# Patient Record
Sex: MALE | Race: WHITE | NOT HISPANIC OR LATINO
[De-identification: names, ages, dates, MRNs, and addresses within clinical notes are randomized per-mention and may not be internally consistent; named-entity substitution may affect disease eponyms.]

---

## 2017-02-11 ENCOUNTER — TRANSCRIPTION ENCOUNTER (OUTPATIENT)
Age: 57
End: 2017-02-11

## 2017-08-04 ENCOUNTER — TRANSCRIPTION ENCOUNTER (OUTPATIENT)
Age: 57
End: 2017-08-04

## 2017-08-20 ENCOUNTER — TRANSCRIPTION ENCOUNTER (OUTPATIENT)
Age: 57
End: 2017-08-20

## 2021-01-02 ENCOUNTER — EMERGENCY (EMERGENCY)
Facility: HOSPITAL | Age: 61
LOS: 0 days | Discharge: HOME | End: 2021-01-02
Attending: EMERGENCY MEDICINE | Admitting: EMERGENCY MEDICINE
Payer: COMMERCIAL

## 2021-01-02 VITALS
RESPIRATION RATE: 19 BRPM | TEMPERATURE: 98 F | SYSTOLIC BLOOD PRESSURE: 129 MMHG | OXYGEN SATURATION: 96 % | HEART RATE: 100 BPM | DIASTOLIC BLOOD PRESSURE: 76 MMHG

## 2021-01-02 VITALS
SYSTOLIC BLOOD PRESSURE: 129 MMHG | OXYGEN SATURATION: 96 % | TEMPERATURE: 98 F | DIASTOLIC BLOOD PRESSURE: 76 MMHG | HEART RATE: 86 BPM | RESPIRATION RATE: 19 BRPM | WEIGHT: 195.11 LBS

## 2021-01-02 DIAGNOSIS — U07.1 COVID-19: ICD-10-CM

## 2021-01-02 DIAGNOSIS — F32.9 MAJOR DEPRESSIVE DISORDER, SINGLE EPISODE, UNSPECIFIED: ICD-10-CM

## 2021-01-02 DIAGNOSIS — Z87.891 PERSONAL HISTORY OF NICOTINE DEPENDENCE: ICD-10-CM

## 2021-01-02 DIAGNOSIS — E78.5 HYPERLIPIDEMIA, UNSPECIFIED: ICD-10-CM

## 2021-01-02 DIAGNOSIS — E86.0 DEHYDRATION: ICD-10-CM

## 2021-01-02 LAB
ALBUMIN SERPL ELPH-MCNC: 4.1 G/DL — SIGNIFICANT CHANGE UP (ref 3.5–5.2)
ALP SERPL-CCNC: 102 U/L — SIGNIFICANT CHANGE UP (ref 30–115)
ALT FLD-CCNC: 145 U/L — HIGH (ref 0–41)
ANION GAP SERPL CALC-SCNC: 8 MMOL/L — SIGNIFICANT CHANGE UP (ref 7–14)
AST SERPL-CCNC: 104 U/L — HIGH (ref 0–41)
BASOPHILS # BLD AUTO: 0.01 K/UL — SIGNIFICANT CHANGE UP (ref 0–0.2)
BASOPHILS NFR BLD AUTO: 0.2 % — SIGNIFICANT CHANGE UP (ref 0–1)
BILIRUB SERPL-MCNC: 0.4 MG/DL — SIGNIFICANT CHANGE UP (ref 0.2–1.2)
BUN SERPL-MCNC: 15 MG/DL — SIGNIFICANT CHANGE UP (ref 10–20)
CALCIUM SERPL-MCNC: 8.6 MG/DL — SIGNIFICANT CHANGE UP (ref 8.5–10.1)
CHLORIDE SERPL-SCNC: 101 MMOL/L — SIGNIFICANT CHANGE UP (ref 98–110)
CO2 SERPL-SCNC: 29 MMOL/L — SIGNIFICANT CHANGE UP (ref 17–32)
CREAT SERPL-MCNC: 1.1 MG/DL — SIGNIFICANT CHANGE UP (ref 0.7–1.5)
EOSINOPHIL # BLD AUTO: 0.02 K/UL — SIGNIFICANT CHANGE UP (ref 0–0.7)
EOSINOPHIL NFR BLD AUTO: 0.4 % — SIGNIFICANT CHANGE UP (ref 0–8)
GLUCOSE SERPL-MCNC: 142 MG/DL — HIGH (ref 70–99)
HCT VFR BLD CALC: 45.7 % — SIGNIFICANT CHANGE UP (ref 42–52)
HGB BLD-MCNC: 14.9 G/DL — SIGNIFICANT CHANGE UP (ref 14–18)
IMM GRANULOCYTES NFR BLD AUTO: 1.3 % — HIGH (ref 0.1–0.3)
LYMPHOCYTES # BLD AUTO: 1.14 K/UL — LOW (ref 1.2–3.4)
LYMPHOCYTES # BLD AUTO: 23.8 % — SIGNIFICANT CHANGE UP (ref 20.5–51.1)
MCHC RBC-ENTMCNC: 30.9 PG — SIGNIFICANT CHANGE UP (ref 27–31)
MCHC RBC-ENTMCNC: 32.6 G/DL — SIGNIFICANT CHANGE UP (ref 32–37)
MCV RBC AUTO: 94.8 FL — HIGH (ref 80–94)
MONOCYTES # BLD AUTO: 0.4 K/UL — SIGNIFICANT CHANGE UP (ref 0.1–0.6)
MONOCYTES NFR BLD AUTO: 8.4 % — SIGNIFICANT CHANGE UP (ref 1.7–9.3)
NEUTROPHILS # BLD AUTO: 3.16 K/UL — SIGNIFICANT CHANGE UP (ref 1.4–6.5)
NEUTROPHILS NFR BLD AUTO: 65.9 % — SIGNIFICANT CHANGE UP (ref 42.2–75.2)
NRBC # BLD: 0 /100 WBCS — SIGNIFICANT CHANGE UP (ref 0–0)
PLATELET # BLD AUTO: 145 K/UL — SIGNIFICANT CHANGE UP (ref 130–400)
POTASSIUM SERPL-MCNC: 4.6 MMOL/L — SIGNIFICANT CHANGE UP (ref 3.5–5)
POTASSIUM SERPL-SCNC: 4.6 MMOL/L — SIGNIFICANT CHANGE UP (ref 3.5–5)
PROT SERPL-MCNC: 6.6 G/DL — SIGNIFICANT CHANGE UP (ref 6–8)
RBC # BLD: 4.82 M/UL — SIGNIFICANT CHANGE UP (ref 4.7–6.1)
RBC # FLD: 12.2 % — SIGNIFICANT CHANGE UP (ref 11.5–14.5)
SARS-COV-2 RNA SPEC QL NAA+PROBE: DETECTED
SODIUM SERPL-SCNC: 138 MMOL/L — SIGNIFICANT CHANGE UP (ref 135–146)
WBC # BLD: 4.79 K/UL — LOW (ref 4.8–10.8)
WBC # FLD AUTO: 4.79 K/UL — LOW (ref 4.8–10.8)

## 2021-01-02 PROCEDURE — 99284 EMERGENCY DEPT VISIT MOD MDM: CPT

## 2021-01-02 PROCEDURE — 93010 ELECTROCARDIOGRAM REPORT: CPT

## 2021-01-02 PROCEDURE — 71045 X-RAY EXAM CHEST 1 VIEW: CPT | Mod: 26

## 2021-01-02 RX ORDER — ACETAMINOPHEN 500 MG
650 TABLET ORAL ONCE
Refills: 0 | Status: COMPLETED | OUTPATIENT
Start: 2021-01-02 | End: 2021-01-02

## 2021-01-02 RX ORDER — SODIUM CHLORIDE 9 MG/ML
1000 INJECTION, SOLUTION INTRAVENOUS ONCE
Refills: 0 | Status: COMPLETED | OUTPATIENT
Start: 2021-01-02 | End: 2021-01-02

## 2021-01-02 RX ORDER — ONDANSETRON 8 MG/1
4 TABLET, FILM COATED ORAL ONCE
Refills: 0 | Status: COMPLETED | OUTPATIENT
Start: 2021-01-02 | End: 2021-01-02

## 2021-01-02 RX ADMIN — ONDANSETRON 4 MILLIGRAM(S): 8 TABLET, FILM COATED ORAL at 08:01

## 2021-01-02 RX ADMIN — Medication 650 MILLIGRAM(S): at 08:01

## 2021-01-02 RX ADMIN — SODIUM CHLORIDE 1000 MILLILITER(S): 9 INJECTION, SOLUTION INTRAVENOUS at 08:01

## 2021-01-02 NOTE — ED PROVIDER NOTE - CARE PROVIDER_API CALL
[facility]  [address] [place] [address]  Phone: [phone]  Fax: [phone]  Follow Up Time: Routine

## 2021-01-02 NOTE — ED PROVIDER NOTE - ATTENDING CONTRIBUTION TO CARE
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 no cp, sob. no abd pain. pt tested neg for covid on 12/28 with rapid and pcr [facility]. states pulse ox at home have been above 95%. no recent travel, sick contacts. spoke to pmd today who urged pt to come to ED for possible dehydration.     on exam, AFVSS, well bucky nad, ncat, eomi, perrla, DRY mm, lctab, rrr nl s1s2 no mrg, abd soft ntnd, aaox3, no focal deficits, no le edema or calf ttp,     a/p; covid like/viral symptoms, pt swabbed again for covid, dehydration, will do basic labs, ivf, ekg, cxr, re-eval.

## 2021-01-02 NOTE — ED PROVIDER NOTE - PHYSICAL EXAMINATION
CONSTITUTIONAL: Well-developed; well-nourished; in no acute distress.   SKIN: warm, dry  HEAD: Normocephalic; atraumatic.  EYES: no conjunctival injection. EOMI.   ENT: No nasal discharge; airway clear.    NECK: Supple; non tender.  CARD: S1, S2 normal; Regular rate and rhythm.   RESP: No wheezes, rales or rhonchi.  ABD: soft ntnd.    EXT: Normal ROM.  No LE edema.   LYMPH: No acute cervical adenopathy.  NEURO: Alert, oriented, grossly unremarkable. No FND.   PSYCH: Cooperative, appropriate.

## 2021-01-02 NOTE — ED ADULT TRIAGE NOTE - CHIEF COMPLAINT QUOTE
Pt states he was tested for covid positive on Thursday after having low grade fever for weeks, but has started getting dry mouth, dry cough. Pt said his PMD told him to come becouse he may be dehydrated. Pt has no fever now. He says he take tylenols every time the fever comes.

## 2021-01-02 NOTE — ED PROVIDER NOTE - NS ED ROS FT
Review of Systems:  CONSTITUTIONAL: No fever, No diaphoresis   SKIN: No rash  HEMATOLOGIC: No abnormal bleeding or bruising  EYES: No eye pain, No blurred vision  ENT: No sore throat, No neck pain, No rhinorrhea   RESPIRATORY: No shortness of breath, No cough  CARDIAC: No chest pain, No palpitations  GI: No abdominal pain, +nausea, No vomiting, +diarrhea, No constipation, No bright red blood per rectum or melena. No flank pain  : No dysuria, frequency, hematuria.   MUSCULOSKELETAL: No joint paint, No swelling, No back pain, +myalgias   NEUROLOGIC: No numbness, No focal weakness, No headache, No dizziness  All other systems negative, unless specified in HPI

## 2021-01-02 NOTE — ED ADULT NURSE NOTE - OBJECTIVE STATEMENT
Pt said he has being getting low grade fever at home for 1weeks, tested for covid + on Thursday. Bu t has noticed an increase dryness in his mouth. Dry cough from yesterday. Pt takes Tylenol for hid low grade fever. He said his PMD said to come  because he be dehydrated. He is no Eliquis because of Pleural effusion he had in the past. No fever in ER

## 2021-01-02 NOTE — ED PROVIDER NOTE - NSFOLLOWUPINSTRUCTIONS_ED_ALL_ED_FT
Novel Coronavirus (COVID-19)  The Facts  What is a coronavirus?  Coronaviruses are a large family of viruses that cause illnesses ranging from the common cold  to more severe diseases such as Middle East Respiratory Syndrome (MERS) and Severe Acute  Respiratory Syndrome (SARS).  What is Novel Coronavirus (COVID-19)?  COVID-19 is a new strain of Coronavirus that has not been previously identified in humans. COVID-19  was identified in [place], [place], [place] in December 2019 (COVID-19). COVID-19 has  since been identified outside of [place], in a growing number of countries internationally, including  the [place].  Where can I find the most recent information about COVID-19?  The Centers for Disease Control and Prevention (CDC) is closely monitoring the outbreak caused by the  COVID-19. For the latest information about COVID- 19, visit the CDC website at  https://www.cdc.gov/coronavirus/index.html  How are coronaviruses spread?  Coronaviruses can be transmitted from person-to- person, usually after close contact with an infected person,  for example, in a household, workplace, or healthcare setting via droplets that become airborne after a cough  or sneeze by an affected person. These droplets can then infect a nearby person. It is likely transmission also  occurs by touching recently contaminated surfaces.  What are the symptoms of coronavirus infection?  It depends on the virus, but common signs include fever and/or respiratory symptoms such as  cough and shortness of breath. In more severe cases, infection can cause pneumonia, severe acute  respiratory syndrome, kidney failure and even death. Fortunately, most cases of COVID-19 have an  illness no different than the influenza “flu”. With a majority of these patients having mild symptoms  and overall mortality which appears to be not much different than the flu.  Is there a treatment for a COVID-19?  There is no specific treatment for disease caused by COVID-19. However, many of the symptoms can  be treated based on the patient’s clinical condition. Supportive care for infected persons can be highly  effective.  What can I do to protect myself?  Washing your hands, covering your cough, and disinfecting surfaces are the best precautionary  measures. It is also advisable to avoid close contact with anyone showing symptoms of respiratory  illness such as coughing and sneezing. Those with symptoms should wear a surgical mask when  around others.  What can I do to protect those around me?  If you have been identified as someone who may be infected with COVID-19, we recommend you  follow the self-isolation procedures outlined below to protect those around you and limit the spread  of this virus.   March 3, 2020  Recommendations for Patients Advised to Self-Isolate  for Possible COVID-19 Exposure  We recommend the below precautionary steps from now until 14 days from when you  returned from your travel or date of your last known possible contact:  - Do not go to work, school, or public areas. Avoid using public transportation, ride-sharing, or  taxis.  - As much as possible, separate yourself from other people in your home. If you can, you should  stay in a room and away from other people in your home. Also, you should use a separate  bathroom, if available.  - Wear the supplied mask whenever you are around other people.  - If you have a non-urgent medical appointment, please reschedule for a later date. If the  appointment is urgent, please call the healthcare provider and tell them that you are on selfisolation for possible COVID-19. This will help the healthcare provider’s office take steps to keep  other people from getting infected or exposed. If you can reschedule routine appointments, do  so.  - Wash your hands often with soap and water for at least 15 to 20 seconds or clean your hands  with an alcohol-based hand  that contains 60 to 95% alcohol, covering all surfaces of  your hands and rubbing them together until they feel dry. Soap and water should be used  preferentially if hands are visibly dirty.  - Cover your mouth and nose with a tissue when you cough or sneeze. Throw used tissues in a  lined trash can; immediately wash your hands.  - Avoid touching your eyes, nose, and mouth with your hands.  - Avoid sharing personal household items. You should not share dishes, drinking glasses, cups,  eating utensils, towels, or bedding with other people or pets in your home. After using these  items, they should be washed thoroughly with soap and water.  - Clean and disinfect all “high-touch” surfaces every day. High touch surfaces include counters,  tabletops, doorknobs, light switches, remote controls, bathroom fixtures, toilets, phones,  keyboards, tablets, and bedside tables. Also, clean any surfaces that may have blood, stool, or  body fluids on them.       If you develop worsening symptoms:  - If you develop worsening symptoms, such as severe shortness of breath, please call [phone] option #9. They will assist you in determining your next steps.  During your time on self-isolation do the following:  - Work from home if you are able to so.  - Limit social isolation by talking with friends and family on the phone or with face-time  - Talk with friends and relatives who don’t live with you about supporting each other if one  household has to be quarantined. For example, agree to drop groceries or other supplies at the  front door.  - Exercise and spend time outdoors away from others if able to do so.    Why didn’t I get tested for novel coronavirus (COVID-19)?  The number of available tests is very limited so strict rules exist for who is allowed to be tested.  [facility] has been authorized to perform testing and is currently working hard to be  able to start providing the test. Such testing is currently reserved for patients who have had  contact with someone infected with the virus, or those who are very sick a plus those who have  traveled to areas identified by the Centers for Disease Control and Prevention (CD) and will  require hospitalization.  What should I do now?  If you are well enough to be discharged home and are not in a high risk group to have  contracted the COVID-10, you should care for yourself at home exactly like you would if you  have Influenza “flu”. Follow all the standard guidelines about washing your hands, covering  your cough, etc.  You should return to the Emergency Department if you develop worse symptoms, trouble  breathing, chest pain, and/or a fever that doesn’t improve with over the counter  acetaminophen or ibuprofen.

## 2021-01-02 NOTE — ED ADULT TRIAGE NOTE - LOCATION:
Called Dr. Allison due to pt in and out of a-fib and in and out of tachycardia for the past hour. Was in pt's room inserting IV when this began. Pt was asymptomatic. Pt is currently sleeping and still asymptomatic. No new orders given at this time.    Right arm;

## 2021-01-02 NOTE — ED PROVIDER NOTE - OBJECTIVE STATEMENT
61 y/o M PMHx PE 5 years ago on Eliquis, HLD, depression presents to ED with generalized weakness, low grade fevers Tmax 100.3F at home for 1 week. Pt also reports nonbloody diarrhea x3 days, a few movements per day. Pt reports nausea as well, no vomiting. Pt called PMD who told pt he was dehydrated and come to ED for evaluation. Pt recently tested negative for COVID.

## 2021-01-02 NOTE — ED PROVIDER NOTE - CLINICAL SUMMARY MEDICAL DECISION MAKING FREE TEXT BOX
pt feels better, informed COVID+, no resp distress or hypoxia, will dc home, supportive care, has a pulse ox, f/u pmd 1-2 weeks, strict return precautions provided.

## 2021-01-02 NOTE — ED PROVIDER NOTE - PATIENT PORTAL LINK FT
You can access the FollowMyHealth Patient Portal offered by [facility] by registering at the following website: [email or web address]. By joining [facility]’s FollowMyHealth portal, you will also be able to view your health information using other applications (apps) compatible with our system.

## 2021-01-03 ENCOUNTER — INPATIENT (INPATIENT)
Facility: HOSPITAL | Age: 61
LOS: 17 days | Discharge: HOME | End: 2021-01-21
Attending: HOSPITALIST | Admitting: HOSPITALIST
Payer: COMMERCIAL

## 2021-01-03 VITALS
DIASTOLIC BLOOD PRESSURE: 86 MMHG | TEMPERATURE: 98 F | SYSTOLIC BLOOD PRESSURE: 134 MMHG | OXYGEN SATURATION: 92 % | HEART RATE: 113 BPM | RESPIRATION RATE: 20 BRPM

## 2021-01-03 DIAGNOSIS — Z87.891 PERSONAL HISTORY OF NICOTINE DEPENDENCE: ICD-10-CM

## 2021-01-03 DIAGNOSIS — E87.5 HYPERKALEMIA: ICD-10-CM

## 2021-01-03 DIAGNOSIS — J68.0 BRONCHITIS AND PNEUMONITIS DUE TO CHEMICALS, GASES, FUMES AND VAPORS: ICD-10-CM

## 2021-01-03 DIAGNOSIS — I10 ESSENTIAL (PRIMARY) HYPERTENSION: ICD-10-CM

## 2021-01-03 LAB
ALBUMIN SERPL ELPH-MCNC: 3.8 G/DL — SIGNIFICANT CHANGE UP (ref 3.5–5.2)
ALP SERPL-CCNC: 99 U/L — SIGNIFICANT CHANGE UP (ref 30–115)
ALT FLD-CCNC: 108 U/L — HIGH (ref 0–41)
ANION GAP SERPL CALC-SCNC: 12 MMOL/L — SIGNIFICANT CHANGE UP (ref 7–14)
AST SERPL-CCNC: 73 U/L — HIGH (ref 0–41)
BASE EXCESS BLDV CALC-SCNC: 4.4 MMOL/L — HIGH (ref -2–2)
BASOPHILS # BLD AUTO: 0.01 K/UL — SIGNIFICANT CHANGE UP (ref 0–0.2)
BASOPHILS NFR BLD AUTO: 0.2 % — SIGNIFICANT CHANGE UP (ref 0–1)
BILIRUB SERPL-MCNC: 0.4 MG/DL — SIGNIFICANT CHANGE UP (ref 0.2–1.2)
BUN SERPL-MCNC: 15 MG/DL — SIGNIFICANT CHANGE UP (ref 10–20)
CA-I SERPL-SCNC: 1.07 MMOL/L — LOW (ref 1.12–1.3)
CALCIUM SERPL-MCNC: 8.1 MG/DL — LOW (ref 8.5–10.1)
CHLORIDE SERPL-SCNC: 95 MMOL/L — LOW (ref 98–110)
CO2 SERPL-SCNC: 24 MMOL/L — SIGNIFICANT CHANGE UP (ref 17–32)
CREAT SERPL-MCNC: 0.9 MG/DL — SIGNIFICANT CHANGE UP (ref 0.7–1.5)
D DIMER BLD IA.RAPID-MCNC: 181 NG/ML DDU — SIGNIFICANT CHANGE UP (ref 0–230)
EOSINOPHIL # BLD AUTO: 0 K/UL — SIGNIFICANT CHANGE UP (ref 0–0.7)
EOSINOPHIL NFR BLD AUTO: 0 % — SIGNIFICANT CHANGE UP (ref 0–8)
GAS PNL BLDV: 133 MMOL/L — LOW (ref 136–145)
GAS PNL BLDV: SIGNIFICANT CHANGE UP
GLUCOSE SERPL-MCNC: 150 MG/DL — HIGH (ref 70–99)
HCO3 BLDV-SCNC: 28 MMOL/L — SIGNIFICANT CHANGE UP (ref 22–29)
HCT VFR BLD CALC: 41.2 % — LOW (ref 42–52)
HCT VFR BLDA CALC: 45.1 % — HIGH (ref 34–44)
HGB BLD CALC-MCNC: 14.7 G/DL — SIGNIFICANT CHANGE UP (ref 14–18)
HGB BLD-MCNC: 13.8 G/DL — LOW (ref 14–18)
IMM GRANULOCYTES NFR BLD AUTO: 1.1 % — HIGH (ref 0.1–0.3)
LACTATE BLDV-MCNC: 1.5 MMOL/L — SIGNIFICANT CHANGE UP (ref 0.5–1.6)
LYMPHOCYTES # BLD AUTO: 0.71 K/UL — LOW (ref 1.2–3.4)
LYMPHOCYTES # BLD AUTO: 11.1 % — LOW (ref 20.5–51.1)
MCHC RBC-ENTMCNC: 31.2 PG — HIGH (ref 27–31)
MCHC RBC-ENTMCNC: 33.5 G/DL — SIGNIFICANT CHANGE UP (ref 32–37)
MCV RBC AUTO: 93.2 FL — SIGNIFICANT CHANGE UP (ref 80–94)
MONOCYTES # BLD AUTO: 0.45 K/UL — SIGNIFICANT CHANGE UP (ref 0.1–0.6)
MONOCYTES NFR BLD AUTO: 7.1 % — SIGNIFICANT CHANGE UP (ref 1.7–9.3)
NEUTROPHILS # BLD AUTO: 5.13 K/UL — SIGNIFICANT CHANGE UP (ref 1.4–6.5)
NEUTROPHILS NFR BLD AUTO: 80.5 % — HIGH (ref 42.2–75.2)
NRBC # BLD: 0 /100 WBCS — SIGNIFICANT CHANGE UP (ref 0–0)
PCO2 BLDV: 40 MMHG — LOW (ref 41–51)
PH BLDV: 7.46 — HIGH (ref 7.26–7.43)
PLATELET # BLD AUTO: 186 K/UL — SIGNIFICANT CHANGE UP (ref 130–400)
PO2 BLDV: 33 MMHG — SIGNIFICANT CHANGE UP (ref 20–40)
POTASSIUM BLDV-SCNC: 4.1 MMOL/L — SIGNIFICANT CHANGE UP (ref 3.3–5.6)
POTASSIUM SERPL-MCNC: 4.3 MMOL/L — SIGNIFICANT CHANGE UP (ref 3.5–5)
POTASSIUM SERPL-SCNC: 4.3 MMOL/L — SIGNIFICANT CHANGE UP (ref 3.5–5)
PROT SERPL-MCNC: 6.1 G/DL — SIGNIFICANT CHANGE UP (ref 6–8)
RBC # BLD: 4.42 M/UL — LOW (ref 4.7–6.1)
RBC # FLD: 12.1 % — SIGNIFICANT CHANGE UP (ref 11.5–14.5)
SAO2 % BLDV: 64 % — SIGNIFICANT CHANGE UP
SODIUM SERPL-SCNC: 131 MMOL/L — LOW (ref 135–146)
TROPONIN T SERPL-MCNC: <0.01 NG/ML — SIGNIFICANT CHANGE UP
WBC # BLD: 6.37 K/UL — SIGNIFICANT CHANGE UP (ref 4.8–10.8)
WBC # FLD AUTO: 6.37 K/UL — SIGNIFICANT CHANGE UP (ref 4.8–10.8)

## 2021-01-03 PROCEDURE — 99285 EMERGENCY DEPT VISIT HI MDM: CPT

## 2021-01-03 PROCEDURE — 71045 X-RAY EXAM CHEST 1 VIEW: CPT | Mod: 26

## 2021-01-03 PROCEDURE — 93010 ELECTROCARDIOGRAM REPORT: CPT

## 2021-01-03 RX ORDER — ONDANSETRON 8 MG/1
4 TABLET, FILM COATED ORAL ONCE
Refills: 0 | Status: COMPLETED | OUTPATIENT
Start: 2021-01-03 | End: 2021-01-03

## 2021-01-03 RX ORDER — SODIUM CHLORIDE 9 MG/ML
1000 INJECTION, SOLUTION INTRAVENOUS
Refills: 0 | Status: DISCONTINUED | OUTPATIENT
Start: 2021-01-03 | End: 2021-01-04

## 2021-01-03 RX ORDER — DEXAMETHASONE 0.5 MG/5ML
6 ELIXIR ORAL ONCE
Refills: 0 | Status: COMPLETED | OUTPATIENT
Start: 2021-01-03 | End: 2021-01-03

## 2021-01-03 RX ADMIN — Medication 6 MILLIGRAM(S): at 21:52

## 2021-01-03 RX ADMIN — SODIUM CHLORIDE 125 MILLILITER(S): 9 INJECTION, SOLUTION INTRAVENOUS at 21:07

## 2021-01-03 RX ADMIN — ONDANSETRON 4 MILLIGRAM(S): 8 TABLET, FILM COATED ORAL at 21:07

## 2021-01-03 NOTE — H&P ADULT - NSICDXPASTMEDICALHX_GEN_ALL_CORE_FT
PAST MEDICAL HISTORY:  Depression     Hyperlipidemia     PE (pulmonary thromboembolism)     Solitary kidney

## 2021-01-03 NOTE — H&P ADULT - HISTORY OF PRESENT ILLNESS
61yo male with pmhx of PE on eliquis 5 years ago, HLD, depression, congenital solitary kidney, vasovagal syncope presented with SOB, weakness, and fevers 10 days duration. He states he got a covid test at an urgent care about 6 days ago but it was negative. Patient presented to ED yesterday weakness/fatigue and was found covid positive. He saturated normally on room air, CXR showed b/l opacities, and was d/c'ed from ED. Presented again with worsening SOB and NBNB vomiting. Denies any other symptoms such as CP, palpitations, dizziness, diarrhea, dysuria. 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     In ED, vitals showed T 98.2F, , 134/86, O2 92% on room air improved to 94% on 2L NC. Labs significant for lymphopenia and Na 131. EKG showed HR NSR 90HR. CXR showed b/l worsening opacities compared to previous CXR on 1/2/20.  61yo male with pmhx of PE on eliquis 5 years ago, HLD, depression, congenital solitary kidney, vasovagal syncope presented with SOB, weakness, and fevers 10 days duration. He states he got a covid test at an urgent care about 6 days ago but it was negative. Patient presented to ED yesterday weakness/fatigue and was found covid positive. He saturated normally on room air, CXR showed b/l opacities, and was d/c'ed from ED. Presented again with worsening SOB and NBNB vomiting. Denies any other symptoms such as CP, palpitations, dizziness, diarrhea, dysuria. 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     In ED, vitals showed T 98.2F, , 134/86, O2 92% on room air improved to 94% on 2L NC. Labs significant for lymphopenia and Na 131. EKG showed HR NSR 90HR. CXR showed b/l worsening opacities compared to previous CXR on 1/2/20.     [facility] [place] but said [facility].  59yo male with pmhx of PE on eliquis 5 years ago, HLD, depression, congenital solitary kidney, vasovagal syncope presented with SOB, weakness, and fevers 10 days duration. He states he got a covid test at an urgent care about 6 days ago but it was negative. Patient presented to ED yesterday weakness/fatigue and was found covid positive. He saturated normally on room air, CXR showed b/l opacities, and was d/c'ed from ED. Presented again with worsening SOB and NBNB vomiting. Denies any other symptoms such as CP, palpitations, dizziness, diarrhea, dysuria. 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     In ED, vitals showed T 98.2F, , 134/86, O2 92% on room air improved to 94% on 2L NC. Labs significant for lymphopenia and Na 131. EKG showed HR NSR 90HR. CXR showed b/l worsening opacities compared to previous CXR on 1/2/20. Patient was given decadron 6mg IV, zofran IV, and LR 1L.     [facility] [place] but [facility].  61yo male with pmhx of PE on eliquis 5 years ago, HLD, depression, congenital solitary kidney, vasovagal syncope presented with SOB, weakness, and fevers 10 days duration. He states he got a covid test at an urgent care about 6 days ago but it was negative. Patient presented to ED yesterday weakness/fatigue and was found covid positive. He saturated normally on room air, CXR showed b/l opacities, and was d/c'ed from ED. Presented again with worsening SOB and NBNB vomiting. Denies any other symptoms such as CP, palpitations, dizziness, diarrhea, dysuria. 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     In ED, vitals showed T 98.2F, , 134/86, O2 92% on room air improved to 94% on 2L NC. Labs significant for lymphopenia and Na 131. EKG showed HR NSR 90HR. CXR showed b/l worsening opacities compared to previous CXR on 1/2/20. Patient was given decadron 6mg IV, zofran IV, and LR 1L.

## 2021-01-03 NOTE — ED PROVIDER NOTE - NS ED ROS FT
Constitutional: no recent weight loss, change in appetite  Eyes: no redness/discharge/pain/vision changes  ENT: no rhinorrhea/ear pain/sore throat  Cardiac: No chest pain, SOB or edema.  Respiratory: No cough or respiratory distress  GI: No diarrhea or abdominal pain.  : No dysuria, frequency, urgency or hematuria  MS: no pain to back or extremities, no loss of ROM  Neuro: No headache. No LOC.  Skin: No skin rash.  Endocrine: No history of thyroid disease or diabetes.  Except as documented in the HPI, all other systems are negative.

## 2021-01-03 NOTE — H&P ADULT - NSHPLABSRESULTS_GEN_ALL_CORE
LABS:  cret                        13.8   6.37  )-----------( 186      ( 03 Jan 2021 21:09 )             41.2     01-03    131<L>  |  95<L>  |  15  ----------------------------<  150<H>  4.3   |  24  |  0.9    Ca    8.1<L>      03 Jan 2021 21:09    TPro  6.1  /  Alb  3.8  /  TBili  0.4  /  DBili  x   /  AST  73<H>  /  ALT  108<H>  /  AlkPhos  99  01-03            CXR 1/3/20: worsening b/l opacities

## 2021-01-03 NOTE — H&P ADULT - ATTENDING COMMENTS
59 YO M with a PMH of HLD, depression, congenital solitary kidney, vasovagal syncope, hx of PE (on apixaban), and COVID19+ (1/2) who presents to the hospital with a c/o hypoxia to the 80's on home pulse ox. Associated with fevers, SOB, N/V, and non-productive cough for the past x 9 days. Denies any runny nose, sore throat, rashes, CP, palpitations, LE swelling, ABD pain, and dysuria. - sick contacts. - recent travel. In the ED, Chest X-ray with B/L interstitial opacities (R > L); pending official read. Hypoxic to 89% on RA, placed on NC. Isolated and COVID19 swab was positive.     Physical exam shows pt in NAD. VSS, afebrile, not hypoxic on 3L NC. A&Ox3. Non-focal neuro exam. Muscle strength/sensation intact. CTA B/L with no W/C/R. RRR, no M/G/R. ABD is soft and non-tender, normoactive BSs. LEs without swelling. No rashes. Labs and radiology as above.     SOB + Fevers + Cough due to COVID19 pneumonia + acute hypoxic respiratory failure, no sepsis present on admission. +/- recent travel. +/- COVID19 contact. Admit to COVID19 isolation unit. Send Coags, CRP, procal, D-dimer, and LDH. Trend CBC, Ck, and LFTs. Send ferritin and fibrinogen. FU official Chest XR report. IV ABXs (). IV Steroids. Remdesivir/Plasma protocol. IVFs (LR). APAP PRN. Anti-tussives PRN. Supplemental O2 PRN. Prone pt as tolerated. Lovenox BID/Heparin drip. ID Consult. 61 YO M with a PMH of HLD, depression, congenital solitary kidney, vasovagal syncope, hx of PE (on apixaban), and COVID19+ (1/2) who presents to the hospital with a c/o hypoxia to the 80's on home pulse ox. Associated with fevers, SOB, N/V, and non-productive cough for the past x 9 days. Denies any runny nose, sore throat, rashes, CP, palpitations, LE swelling, ABD pain, and dysuria. - sick contacts. - recent travel. In the ED, Chest X-ray with B/L interstitial opacities (R > L); pending official read. Hypoxic to 89% on RA, placed on NC. Isolated and COVID19 swab was positive.     Physical exam shows pt in NAD. VSS, afebrile, not hypoxic on 3L NC. A&Ox3. Non-focal neuro exam. Muscle strength/sensation intact. CTA B/L with no W/C/R. RRR, no M/G/R. ABD is soft and non-tender, normoactive BSs. LEs without swelling. No rashes. Labs and radiology as above.     SOB + Fevers + Cough due to COVID19 pneumonia + acute hypoxic respiratory failure, No sepsis present on admission (pt only had elevated HR, never met SIRs criteria). - recent travel. - COVID19 contact. Admit to COVID19 isolation unit. Send Coags, CRP, procal, D-dimer, and LDH. Trend CBC, Ck, and LFTs. Send ferritin and fibrinogen. FU official Chest XR report. IV Steroids. Remdesivir protocol. IVFs (LR). APAP PRN. Anti-tussives PRN. Supplemental O2 PRN. Prone pt as tolerated. Lovenox.     Lymphocytopenia and transaminitis, from above. Management as above.     Diabetes mellitus with hyperglycemia. A1c. FSs. Insulin PRN.     H of HLD, depression, congenital solitary kidney, vasovagal syncope, and hx of PE (on apixaban). Restart home meds. DVT PPX. Inform PCP of pt's admission to hospital. My note supersedes the residents note.

## 2021-01-03 NOTE — ED PROVIDER NOTE - OBJECTIVE STATEMENT
59 y/o M PMHx PE 5 years ago on Eliquis, HLD, depression returned to ED after being seen yesterday for weakness and fever 2/2 covid19 now c/o nausea and 2 episodes of nbnb vomiting as well as worsening sob. Denies HA/dizziness/chest pain/palpitation/sob/d/black stool/bloody stool/urinary sxs

## 2021-01-03 NOTE — H&P ADULT - NSHPSOCIALHISTORY_GEN_ALL_CORE
Marital Status:  ( x  )    (   ) Single    (   )    (  )   Lives with: (  ) alone  (  ) children   (x  ) spouse   (  ) parents  (  ) other  Recent Travel: No recent travel  Occupation:     Substance Use (street drugs): (x ) never used  (  ) other:  Tobacco Usage:  remote history 40 years ago, but does not smoke now   Alcohol Usage: socially

## 2021-01-03 NOTE — ED PROVIDER NOTE - PROGRESS NOTE DETAILS
worsening cxr, sats low 90s at rest and worse with exertion. plan to admit and pt aware/agrees with plan

## 2021-01-03 NOTE — ED PROVIDER NOTE - ATTENDING CONTRIBUTION TO CARE
60yM seen yesterday for COVID+ returns tonight w/ intermittent hypoxia as well as n/v and difficulty tolerating PO.  Pt reports no CP or SOB, but is having trouble keeping down food or fluids and says his pulse ox dipped to 88% today.  No fevers.  Pt does not have home O2 at baseline.

## 2021-01-03 NOTE — H&P ADULT - NSHPREVIEWOFSYSTEMS_GEN_ALL_CORE
CONSTITUTIONAL: + fatigue, fevers  EYES/ENT: No visual changes;  No vertigo or throat pain   NECK: No pain or stiffness  RESPIRATORY: + SOB   CARDIOVASCULAR: No chest pain or palpitations  GASTROINTESTINAL: + Vomiting, No abd pain, diarrhea, constipation   GENITOURINARY: No dysuria, frequency or hematuria  NEUROLOGICAL: + weakness  SKIN: No itching, rashes

## 2021-01-03 NOTE — ED PROVIDER NOTE - PHYSICAL EXAMINATION
CONSTITUTIONAL: Well-appearing; well-nourished; in no apparent distress.   CARDIOVASCULAR: Normal S1, S2; no murmurs, rubs, or gallops.   RESPIRATORY: Normal chest excursion with respiration; breath sounds clear and equal bilaterally; no wheezes, rhonchi, or rales.  GI/: non-distended; non-tender; no palpable organomegaly.   SKIN: Normal for age and race; warm; dry; good turgor; no apparent lesions or exudate.   NEURO/PSYCH: A & O x 4; grossly unremarkable. mood and manner are appropriate.

## 2021-01-03 NOTE — ED PROVIDER NOTE - CLINICAL SUMMARY MEDICAL DECISION MAKING FREE TEXT BOX
60yM p/w n/v and intermittent hypoxia in the setting of recent covid diagnosis.  Pt well appearing w/o resp distress, though w/ new oxygen requirement.  Labs reviewed.  CXR w/ worsened b/l opacities compared to 24hr ago.  Pt started on dexamethasone, IV LR given inability to tolerate PO and will adm.

## 2021-01-03 NOTE — ED ADULT TRIAGE NOTE - CHIEF COMPLAINT QUOTE
Positive COVID pt, states he has not been able to eat solid food, vomited x2 today and had episode where POX dropped to 88%

## 2021-01-03 NOTE — H&P ADULT - NSHPPHYSICALEXAM_GEN_ALL_CORE
ICU Vital Signs Last 24 Hrs  T(C): 36.8 (03 Jan 2021 20:03), Max: 36.8 (03 Jan 2021 20:03)  T(F): 98.2 (03 Jan 2021 20:03), Max: 98.2 (03 Jan 2021 20:03)  HR: 113 (03 Jan 2021 20:03) (113 - 113)  BP: 134/86 (03 Jan 2021 20:03) (134/86 - 134/86)  BP(mean): --  ABP: --  ABP(mean): --  RR: 20 (03 Jan 2021 22:12) (20 - 20)  SpO2: 94% (03 Jan 2021 22:12) (92% - 94%)    PHYSICAL EXAM:  GENERAL: NAD, lying in bed comfortably  HEAD:  Atraumatic, Normocephalic  EYES: EOMI, PERRLA, conjunctiva and sclera clear  ENT: Moist mucous membranes  NECK: Supple, No JVD  CHEST/LUNG: bibasilar crackles b/l   HEART: Regular rate and rhythm; No murmurs, rubs, or gallops  ABDOMEN: Bowel sounds present; Soft, Nontender, Nondistended. No hepatomegally  EXTREMITIES:  2+ Peripheral Pulses, brisk capillary refill. No clubbing, cyanosis, or edema  NERVOUS SYSTEM:  Alert & Oriented X3, speech clear. No deficits   MSK: FROM all 4 extremities, full and equal strength  SKIN: No rashes or lesions

## 2021-01-03 NOTE — H&P ADULT - ASSESSMENT
59yo male with pmhx of PE on eliquis 5 years ago, HLD, depression, congenital solitary kidney, vasovagal syncope presented with SOB, weakness, and fevers 10 days duration. Admitted for COVID-19 PNA.    [facility] [place] but [facility]s [facility] on [facility].     Wife Arron cell #: [phone]; updated on patient's status and plan     #Acute hypoxemic respiratory failure secondary to COVID-19 pneumonia  - COVID-19 PCR positive 1/2/20   - EKG: NSR  - Chest X-ray: worsening b/l opacities   - Patient is saturating 94 % on 2L NC   - monitor Inflammatory markers q48hrs:  Ddimer 181  Other markers sent    - Incentive spirometry at bedside  - Start dexamethasone 6mg IV QD for 10 days; will start remdesivir 200mg IV first dose and 100mg for 5 doses  - ID consult; f/u ID regarding if remdesivir is needed   - eliquis for DVT prophylaxis   - Titrate supplemental O2 to maintain SpO2 92-96%  - supportive measures: tylenol, robitussin, zofran   - Isolation precautions (contact, droplet, airborne)    #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

## 2021-01-04 DIAGNOSIS — I26.99 OTHER PULMONARY EMBOLISM WITHOUT ACUTE COR PULMONALE: ICD-10-CM

## 2021-01-04 DIAGNOSIS — Z78.9 OTHER SPECIFIED HEALTH STATUS: Chronic | ICD-10-CM

## 2021-01-04 LAB
A1C WITH ESTIMATED AVERAGE GLUCOSE RESULT: 6.2 % — HIGH (ref 4–5.6)
ALBUMIN SERPL ELPH-MCNC: 3.8 G/DL — SIGNIFICANT CHANGE UP (ref 3.5–5.2)
ALP SERPL-CCNC: 100 U/L — SIGNIFICANT CHANGE UP (ref 30–115)
ALT FLD-CCNC: 111 U/L — HIGH (ref 0–41)
ANION GAP SERPL CALC-SCNC: 12 MMOL/L — SIGNIFICANT CHANGE UP (ref 7–14)
APTT BLD: 36.9 SEC — SIGNIFICANT CHANGE UP (ref 27–39.2)
AST SERPL-CCNC: 71 U/L — HIGH (ref 0–41)
BASOPHILS # BLD AUTO: 0.01 K/UL — SIGNIFICANT CHANGE UP (ref 0–0.2)
BASOPHILS NFR BLD AUTO: 0.2 % — SIGNIFICANT CHANGE UP (ref 0–1)
BILIRUB SERPL-MCNC: 0.4 MG/DL — SIGNIFICANT CHANGE UP (ref 0.2–1.2)
BLD GP AB SCN SERPL QL: SIGNIFICANT CHANGE UP
BUN SERPL-MCNC: 15 MG/DL — SIGNIFICANT CHANGE UP (ref 10–20)
CALCIUM SERPL-MCNC: 8.2 MG/DL — LOW (ref 8.5–10.1)
CHLORIDE SERPL-SCNC: 100 MMOL/L — SIGNIFICANT CHANGE UP (ref 98–110)
CHOLEST SERPL-MCNC: 100 MG/DL — SIGNIFICANT CHANGE UP
CO2 SERPL-SCNC: 24 MMOL/L — SIGNIFICANT CHANGE UP (ref 17–32)
CREAT SERPL-MCNC: 1 MG/DL — SIGNIFICANT CHANGE UP (ref 0.7–1.5)
CRP SERPL-MCNC: 5.31 MG/DL — HIGH (ref 0–0.4)
EOSINOPHIL # BLD AUTO: 0 K/UL — SIGNIFICANT CHANGE UP (ref 0–0.7)
EOSINOPHIL NFR BLD AUTO: 0 % — SIGNIFICANT CHANGE UP (ref 0–8)
ESTIMATED AVERAGE GLUCOSE: 131 MG/DL — HIGH (ref 68–114)
FERRITIN SERPL-MCNC: 1290 NG/ML — HIGH (ref 30–400)
GLUCOSE SERPL-MCNC: 206 MG/DL — HIGH (ref 70–99)
HCT VFR BLD CALC: 41.5 % — LOW (ref 42–52)
HCV AB S/CO SERPL IA: 0.03 COI — SIGNIFICANT CHANGE UP
HCV AB SERPL-IMP: SIGNIFICANT CHANGE UP
HDLC SERPL-MCNC: 50 MG/DL — SIGNIFICANT CHANGE UP
HGB BLD-MCNC: 13.7 G/DL — LOW (ref 14–18)
IMM GRANULOCYTES NFR BLD AUTO: 1.3 % — HIGH (ref 0.1–0.3)
INR BLD: 1.19 RATIO — SIGNIFICANT CHANGE UP (ref 0.65–1.3)
LIPID PNL WITH DIRECT LDL SERPL: 37 MG/DL — SIGNIFICANT CHANGE UP
LYMPHOCYTES # BLD AUTO: 0.68 K/UL — LOW (ref 1.2–3.4)
LYMPHOCYTES # BLD AUTO: 12.3 % — LOW (ref 20.5–51.1)
MAGNESIUM SERPL-MCNC: 2.2 MG/DL — SIGNIFICANT CHANGE UP (ref 1.8–2.4)
MCHC RBC-ENTMCNC: 31 PG — SIGNIFICANT CHANGE UP (ref 27–31)
MCHC RBC-ENTMCNC: 33 G/DL — SIGNIFICANT CHANGE UP (ref 32–37)
MCV RBC AUTO: 93.9 FL — SIGNIFICANT CHANGE UP (ref 80–94)
MONOCYTES # BLD AUTO: 0.33 K/UL — SIGNIFICANT CHANGE UP (ref 0.1–0.6)
MONOCYTES NFR BLD AUTO: 6 % — SIGNIFICANT CHANGE UP (ref 1.7–9.3)
NEUTROPHILS # BLD AUTO: 4.43 K/UL — SIGNIFICANT CHANGE UP (ref 1.4–6.5)
NEUTROPHILS NFR BLD AUTO: 80.2 % — HIGH (ref 42.2–75.2)
NON HDL CHOLESTEROL: 50 MG/DL — SIGNIFICANT CHANGE UP
NRBC # BLD: 0 /100 WBCS — SIGNIFICANT CHANGE UP (ref 0–0)
PLATELET # BLD AUTO: 196 K/UL — SIGNIFICANT CHANGE UP (ref 130–400)
POTASSIUM SERPL-MCNC: 4.8 MMOL/L — SIGNIFICANT CHANGE UP (ref 3.5–5)
POTASSIUM SERPL-SCNC: 4.8 MMOL/L — SIGNIFICANT CHANGE UP (ref 3.5–5)
PROCALCITONIN SERPL-MCNC: 0.18 NG/ML — HIGH (ref 0.02–0.1)
PROT SERPL-MCNC: 6.4 G/DL — SIGNIFICANT CHANGE UP (ref 6–8)
PROTHROM AB SERPL-ACNC: 13.7 SEC — HIGH (ref 9.95–12.87)
RBC # BLD: 4.42 M/UL — LOW (ref 4.7–6.1)
RBC # FLD: 12.1 % — SIGNIFICANT CHANGE UP (ref 11.5–14.5)
SODIUM SERPL-SCNC: 136 MMOL/L — SIGNIFICANT CHANGE UP (ref 135–146)
TRIGL SERPL-MCNC: 80 MG/DL — SIGNIFICANT CHANGE UP
WBC # BLD: 5.52 K/UL — SIGNIFICANT CHANGE UP (ref 4.8–10.8)
WBC # FLD AUTO: 5.52 K/UL — SIGNIFICANT CHANGE UP (ref 4.8–10.8)

## 2021-01-04 PROCEDURE — 99497 ADVNCD CARE PLAN 30 MIN: CPT | Mod: 25

## 2021-01-04 PROCEDURE — 99223 1ST HOSP IP/OBS HIGH 75: CPT

## 2021-01-04 PROCEDURE — 71045 X-RAY EXAM CHEST 1 VIEW: CPT | Mod: 26

## 2021-01-04 RX ORDER — BUPROPION HYDROCHLORIDE 150 MG/1
300 TABLET, EXTENDED RELEASE ORAL DAILY
Refills: 0 | Status: DISCONTINUED | OUTPATIENT
Start: 2021-01-04 | End: 2021-01-07

## 2021-01-04 RX ORDER — REMDESIVIR 5 MG/ML
INJECTION INTRAVENOUS
Refills: 0 | Status: DISCONTINUED | OUTPATIENT
Start: 2021-01-04 | End: 2021-01-04

## 2021-01-04 RX ORDER — SODIUM CHLORIDE 9 MG/ML
1000 INJECTION INTRAMUSCULAR; INTRAVENOUS; SUBCUTANEOUS
Refills: 0 | Status: DISCONTINUED | OUTPATIENT
Start: 2021-01-04 | End: 2021-01-04

## 2021-01-04 RX ORDER — ESCITALOPRAM OXALATE 10 MG/1
10 TABLET, FILM COATED ORAL DAILY
Refills: 0 | Status: DISCONTINUED | OUTPATIENT
Start: 2021-01-04 | End: 2021-01-07

## 2021-01-04 RX ORDER — BUPROPION HYDROCHLORIDE 150 MG/1
1 TABLET, EXTENDED RELEASE ORAL
Qty: 0 | Refills: 0 | DISCHARGE

## 2021-01-04 RX ORDER — ONDANSETRON 8 MG/1
4 TABLET, FILM COATED ORAL EVERY 8 HOURS
Refills: 0 | Status: DISCONTINUED | OUTPATIENT
Start: 2021-01-04 | End: 2021-01-07

## 2021-01-04 RX ORDER — APIXABAN 2.5 MG/1
5 TABLET, FILM COATED ORAL EVERY 12 HOURS
Refills: 0 | Status: DISCONTINUED | OUTPATIENT
Start: 2021-01-04 | End: 2021-01-07

## 2021-01-04 RX ORDER — APIXABAN 2.5 MG/1
1 TABLET, FILM COATED ORAL
Qty: 0 | Refills: 0 | DISCHARGE

## 2021-01-04 RX ORDER — REMDESIVIR 5 MG/ML
200 INJECTION INTRAVENOUS EVERY 24 HOURS
Refills: 0 | Status: COMPLETED | OUTPATIENT
Start: 2021-01-04 | End: 2021-01-04

## 2021-01-04 RX ORDER — CHLORHEXIDINE GLUCONATE 213 G/1000ML
1 SOLUTION TOPICAL
Refills: 0 | Status: DISCONTINUED | OUTPATIENT
Start: 2021-01-04 | End: 2021-01-07

## 2021-01-04 RX ORDER — ATORVASTATIN CALCIUM 80 MG/1
1 TABLET, FILM COATED ORAL
Qty: 0 | Refills: 0 | DISCHARGE

## 2021-01-04 RX ORDER — ACETAMINOPHEN 500 MG
650 TABLET ORAL EVERY 6 HOURS
Refills: 0 | Status: DISCONTINUED | OUTPATIENT
Start: 2021-01-04 | End: 2021-01-07

## 2021-01-04 RX ORDER — GUAIFENESIN/DEXTROMETHORPHAN 600MG-30MG
10 TABLET, EXTENDED RELEASE 12 HR ORAL EVERY 8 HOURS
Refills: 0 | Status: DISCONTINUED | OUTPATIENT
Start: 2021-01-04 | End: 2021-01-07

## 2021-01-04 RX ORDER — ESCITALOPRAM OXALATE 10 MG/1
1 TABLET, FILM COATED ORAL
Qty: 0 | Refills: 0 | DISCHARGE

## 2021-01-04 RX ORDER — ATORVASTATIN CALCIUM 80 MG/1
40 TABLET, FILM COATED ORAL AT BEDTIME
Refills: 0 | Status: DISCONTINUED | OUTPATIENT
Start: 2021-01-04 | End: 2021-01-07

## 2021-01-04 RX ORDER — DEXAMETHASONE 0.5 MG/5ML
6 ELIXIR ORAL DAILY
Refills: 0 | Status: DISCONTINUED | OUTPATIENT
Start: 2021-01-04 | End: 2021-01-07

## 2021-01-04 RX ADMIN — ONDANSETRON 4 MILLIGRAM(S): 8 TABLET, FILM COATED ORAL at 17:28

## 2021-01-04 RX ADMIN — Medication 10 MILLILITER(S): at 07:24

## 2021-01-04 RX ADMIN — REMDESIVIR 200 MILLIGRAM(S): 5 INJECTION INTRAVENOUS at 03:23

## 2021-01-04 RX ADMIN — Medication 6 MILLIGRAM(S): at 07:24

## 2021-01-04 RX ADMIN — APIXABAN 5 MILLIGRAM(S): 2.5 TABLET, FILM COATED ORAL at 07:24

## 2021-01-04 RX ADMIN — BUPROPION HYDROCHLORIDE 300 MILLIGRAM(S): 150 TABLET, EXTENDED RELEASE ORAL at 12:43

## 2021-01-04 RX ADMIN — ESCITALOPRAM OXALATE 10 MILLIGRAM(S): 10 TABLET, FILM COATED ORAL at 12:42

## 2021-01-04 RX ADMIN — ATORVASTATIN CALCIUM 40 MILLIGRAM(S): 80 TABLET, FILM COATED ORAL at 22:01

## 2021-01-04 RX ADMIN — APIXABAN 5 MILLIGRAM(S): 2.5 TABLET, FILM COATED ORAL at 17:28

## 2021-01-04 NOTE — CONSULT NOTE ADULT - ASSESSMENT
· Assessment	  59yo male with pmhx of PE on eliquis 5 years ago, HLD, depression, congenital solitary kidney, vasovagal syncope presented with SOB, weakness, and fevers 10 days duration. Admitted for COVID-19 PNA.    IMPRESSION;  Appears linically stable and feels well and has no complaints improved  COVID 19 with severe illness. SpO2 < 94% on RA and need for supplemental O2.   Pt is in the late inflammatory response phase ot the illness based on the onset of symptoms. ( 9 days )  Transaminitis : COVID-19 related  ddimers 181    RECOMMENDATIONS;  Could finish course of steroids with po prednisone 40 mg q24 for a total of 10 days  Could transfer to [facility] for further management.   D/c RDV

## 2021-01-04 NOTE — CONSULT NOTE ADULT - SUBJECTIVE AND OBJECTIVE BOX
ROSALVA DEAN  60y, Male  Allergy: No Known Allergies      All historical available data reviewed.    HPI:  59yo male with pmhx of PE on eliquis 5 years ago, HLD, depression, congenital solitary kidney, vasovagal syncope presented with SOB, weakness, and fevers 10 days duration. He states he got a covid test at an urgent care about 6 days ago but it was negative. Patient presented to ED yesterday weakness/fatigue and was found covid positive. He saturated normally on room air, CXR showed b/l opacities, and was d/c'ed from ED. Presented again with worsening SOB and NBNB vomiting. Denies any other symptoms such as CP, palpitations, dizziness, diarrhea, dysuria. 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     In ED, vitals showed T 98.2F, , 134/86, O2 92% on room air improved to 94% on 2L NC. Labs significant for lymphopenia and Na 131. EKG showed HR NSR 90HR. CXR showed b/l worsening opacities compared to previous CXR on 1/2/20. Patient was given decadron 6mg IV, zofran IV, and LR 1L.    (03 Jan 2021 23:34)    ID called for COVID-19     FAMILY HISTORY:  Family history of hyperlipidemia    Family history of cardiac disorder in father      PAST MEDICAL & SURGICAL HISTORY:  Solitary kidney    Depression    Hyperlipidemia    PE (pulmonary thromboembolism)    No pertinent past surgical history          VITALS:  T(F): 98.5, Max: 98.5 (01-04-21 @ 07:38)  HR: 85  BP: 119/71  RR: 20Vital Signs Last 24 Hrs  T(C): 36.9 (04 Jan 2021 07:38), Max: 36.9 (04 Jan 2021 07:38)  T(F): 98.5 (04 Jan 2021 07:38), Max: 98.5 (04 Jan 2021 07:38)  HR: 85 (04 Jan 2021 07:38) (85 - 113)  BP: 119/71 (04 Jan 2021 07:38) (119/71 - 134/86)  BP(mean): --  RR: 20 (04 Jan 2021 07:38) (18 - 20)  SpO2: 93% (04 Jan 2021 07:38) (92% - 94%)    TESTS & MEASUREMENTS:                        13.7   5.52  )-----------( 196      ( 04 Jan 2021 07:24 )             41.5     01-04    136  |  100  |  15  ----------------------------<  206<H>  4.8   |  24  |  1.0    Ca    8.2<L>      04 Jan 2021 07:24  Mg     2.2     01-04    TPro  6.4  /  Alb  3.8  /  TBili  0.4  /  DBili  x   /  AST  71<H>  /  ALT  111<H>  /  AlkPhos  100  01-04    LIVER FUNCTIONS - ( 04 Jan 2021 07:24 )  Alb: 3.8 g/dL / Pro: 6.4 g/dL / ALK PHOS: 100 U/L / ALT: 111 U/L / AST: 71 U/L / GGT: x                   RADIOLOGY & ADDITIONAL TESTS:  Personal review of radiological diagnostics performed  Echo and EKG results noted when applicable.     MEDICATIONS:  acetaminophen   Tablet .. 650 milliGRAM(s) Oral every 6 hours PRN  apixaban 5 milliGRAM(s) Oral every 12 hours  atorvastatin 40 milliGRAM(s) Oral at bedtime  buPROPion XL . 300 milliGRAM(s) Oral daily  chlorhexidine 4% Liquid 1 Application(s) Topical <User Schedule>  dexAMETHasone  Injectable 6 milliGRAM(s) IV Push daily  escitalopram 10 milliGRAM(s) Oral daily  guaifenesin/dextromethorphan  Syrup 10 milliLiter(s) Oral every 8 hours PRN  ondansetron Injectable 4 milliGRAM(s) IV Push every 8 hours PRN  remdesivir  IVPB   IV Intermittent       ANTIBIOTICS:  remdesivir  IVPB   IV Intermittent

## 2021-01-04 NOTE — GOALS OF CARE CONVERSATION - ADVANCED CARE PLANNING - CONVERSATION DETAILS
***Verbal consents obtained due to the pt being COVID19+ and in an effort to prevent contamination of the paperwork      The pt wishes to be FULL CODE with no limitations in medical interventions.

## 2021-01-05 LAB
ALBUMIN SERPL ELPH-MCNC: 3.9 G/DL — SIGNIFICANT CHANGE UP (ref 3.5–5.2)
ALP SERPL-CCNC: 89 U/L — SIGNIFICANT CHANGE UP (ref 30–115)
ALT FLD-CCNC: 107 U/L — HIGH (ref 0–41)
ANION GAP SERPL CALC-SCNC: 14 MMOL/L — SIGNIFICANT CHANGE UP (ref 7–14)
AST SERPL-CCNC: 79 U/L — HIGH (ref 0–41)
BASOPHILS # BLD AUTO: 0.02 K/UL — SIGNIFICANT CHANGE UP (ref 0–0.2)
BASOPHILS NFR BLD AUTO: 0.2 % — SIGNIFICANT CHANGE UP (ref 0–1)
BILIRUB SERPL-MCNC: 0.5 MG/DL — SIGNIFICANT CHANGE UP (ref 0.2–1.2)
BUN SERPL-MCNC: 24 MG/DL — HIGH (ref 10–20)
CALCIUM SERPL-MCNC: 8.2 MG/DL — LOW (ref 8.5–10.1)
CHLORIDE SERPL-SCNC: 98 MMOL/L — SIGNIFICANT CHANGE UP (ref 98–110)
CO2 SERPL-SCNC: 24 MMOL/L — SIGNIFICANT CHANGE UP (ref 17–32)
CREAT SERPL-MCNC: 1 MG/DL — SIGNIFICANT CHANGE UP (ref 0.7–1.5)
EOSINOPHIL # BLD AUTO: 0 K/UL — SIGNIFICANT CHANGE UP (ref 0–0.7)
EOSINOPHIL NFR BLD AUTO: 0 % — SIGNIFICANT CHANGE UP (ref 0–8)
GLUCOSE SERPL-MCNC: 201 MG/DL — HIGH (ref 70–99)
HCT VFR BLD CALC: 42.5 % — SIGNIFICANT CHANGE UP (ref 42–52)
HGB BLD-MCNC: 14.2 G/DL — SIGNIFICANT CHANGE UP (ref 14–18)
IMM GRANULOCYTES NFR BLD AUTO: 1.4 % — HIGH (ref 0.1–0.3)
LYMPHOCYTES # BLD AUTO: 1.08 K/UL — LOW (ref 1.2–3.4)
LYMPHOCYTES # BLD AUTO: 9.3 % — LOW (ref 20.5–51.1)
MAGNESIUM SERPL-MCNC: 2.4 MG/DL — SIGNIFICANT CHANGE UP (ref 1.8–2.4)
MCHC RBC-ENTMCNC: 31.2 PG — HIGH (ref 27–31)
MCHC RBC-ENTMCNC: 33.4 G/DL — SIGNIFICANT CHANGE UP (ref 32–37)
MCV RBC AUTO: 93.4 FL — SIGNIFICANT CHANGE UP (ref 80–94)
MONOCYTES # BLD AUTO: 0.63 K/UL — HIGH (ref 0.1–0.6)
MONOCYTES NFR BLD AUTO: 5.4 % — SIGNIFICANT CHANGE UP (ref 1.7–9.3)
NEUTROPHILS # BLD AUTO: 9.72 K/UL — HIGH (ref 1.4–6.5)
NEUTROPHILS NFR BLD AUTO: 83.7 % — HIGH (ref 42.2–75.2)
NRBC # BLD: 0 /100 WBCS — SIGNIFICANT CHANGE UP (ref 0–0)
PLATELET # BLD AUTO: 282 K/UL — SIGNIFICANT CHANGE UP (ref 130–400)
POTASSIUM SERPL-MCNC: 4.7 MMOL/L — SIGNIFICANT CHANGE UP (ref 3.5–5)
POTASSIUM SERPL-SCNC: 4.7 MMOL/L — SIGNIFICANT CHANGE UP (ref 3.5–5)
PROT SERPL-MCNC: 6.5 G/DL — SIGNIFICANT CHANGE UP (ref 6–8)
RBC # BLD: 4.55 M/UL — LOW (ref 4.7–6.1)
RBC # FLD: 12.2 % — SIGNIFICANT CHANGE UP (ref 11.5–14.5)
SODIUM SERPL-SCNC: 136 MMOL/L — SIGNIFICANT CHANGE UP (ref 135–146)
WBC # BLD: 11.61 K/UL — HIGH (ref 4.8–10.8)
WBC # FLD AUTO: 11.61 K/UL — HIGH (ref 4.8–10.8)

## 2021-01-05 PROCEDURE — 99233 SBSQ HOSP IP/OBS HIGH 50: CPT

## 2021-01-05 RX ADMIN — ATORVASTATIN CALCIUM 40 MILLIGRAM(S): 80 TABLET, FILM COATED ORAL at 21:43

## 2021-01-05 RX ADMIN — APIXABAN 5 MILLIGRAM(S): 2.5 TABLET, FILM COATED ORAL at 17:13

## 2021-01-05 RX ADMIN — APIXABAN 5 MILLIGRAM(S): 2.5 TABLET, FILM COATED ORAL at 05:06

## 2021-01-05 RX ADMIN — Medication 6 MILLIGRAM(S): at 05:07

## 2021-01-05 RX ADMIN — BUPROPION HYDROCHLORIDE 300 MILLIGRAM(S): 150 TABLET, EXTENDED RELEASE ORAL at 11:13

## 2021-01-05 RX ADMIN — ESCITALOPRAM OXALATE 10 MILLIGRAM(S): 10 TABLET, FILM COATED ORAL at 11:13

## 2021-01-05 NOTE — CHART NOTE - NSCHARTNOTEFT_GEN_A_CORE
Communications Team    Called Meghan hallman  to relay pt's clinical status and plan per primary team AM rounds, no , voicemail left.

## 2021-01-06 ENCOUNTER — INPATIENT (INPATIENT)
Facility: HOSPITAL | Age: 61
LOS: 14 days | Discharge: HOME | End: 2021-01-21
Attending: HOSPITALIST | Admitting: HOSPITALIST
Payer: SELF-PAY

## 2021-01-06 VITALS — OXYGEN SATURATION: 88 % | RESPIRATION RATE: 18 BRPM

## 2021-01-06 VITALS
SYSTOLIC BLOOD PRESSURE: 131 MMHG | RESPIRATION RATE: 22 BRPM | TEMPERATURE: 100 F | OXYGEN SATURATION: 83 % | HEART RATE: 90 BPM | DIASTOLIC BLOOD PRESSURE: 76 MMHG | HEIGHT: 70 IN

## 2021-01-06 DIAGNOSIS — Z02.9 ENCOUNTER FOR ADMINISTRATIVE EXAMINATIONS, UNSPECIFIED: ICD-10-CM

## 2021-01-06 DIAGNOSIS — Z78.9 OTHER SPECIFIED HEALTH STATUS: Chronic | ICD-10-CM

## 2021-01-06 PROCEDURE — 99285 EMERGENCY DEPT VISIT HI MDM: CPT

## 2021-01-06 RX ADMIN — APIXABAN 5 MILLIGRAM(S): 2.5 TABLET, FILM COATED ORAL at 17:33

## 2021-01-06 RX ADMIN — APIXABAN 5 MILLIGRAM(S): 2.5 TABLET, FILM COATED ORAL at 05:16

## 2021-01-06 RX ADMIN — BUPROPION HYDROCHLORIDE 300 MILLIGRAM(S): 150 TABLET, EXTENDED RELEASE ORAL at 12:37

## 2021-01-06 RX ADMIN — ESCITALOPRAM OXALATE 10 MILLIGRAM(S): 10 TABLET, FILM COATED ORAL at 12:37

## 2021-01-06 RX ADMIN — Medication 6 MILLIGRAM(S): at 05:16

## 2021-01-06 RX ADMIN — CHLORHEXIDINE GLUCONATE 1 APPLICATION(S): 213 SOLUTION TOPICAL at 06:00

## 2021-01-06 RX ADMIN — ATORVASTATIN CALCIUM 40 MILLIGRAM(S): 80 TABLET, FILM COATED ORAL at 20:24

## 2021-01-06 NOTE — ED PROVIDER NOTE - PHYSICAL EXAMINATION
CONSTITUTIONAL: in very mild resp distress  SKIN: Warm, dry  EYES: No conjunctival injection. EOMI  ENT: airway clear  CARD:  Regular rate and rhythm  RESP: decreased BS b/l; No wheezes, crackles, rales or rhonchi  ABD: Soft NTND; No guarding or rebound tenderness  EXT: Normal ROM.  No clubbing or cyanosis.  No edema  NEURO: A&O x3, grossly unremarkable, no focal deficits  PSYCH: Cooperative, appropriate

## 2021-01-06 NOTE — ED PROVIDER NOTE - NS ED ROS FT
Review of Systems:  CONSTITUTIONAL: No fever, No diaphoresis  SKIN: No rash  EYES: No eye pain  ENT: No sore throat, No neck pain, No rhinorrhea  RESPIRATORY: + shortness of breath, No cough  CARDIAC: No chest pain, No palpitations  GI: No abdominal pain, No nausea, No vomiting, No diarrhea, No constipation  MUSCULOSKELETAL: No joint paint, No swelling, No back pain  NEUROLOGIC: No numbness, No focal weakness, No headache, No dizziness  All other systems negative, unless specified in HPI

## 2021-01-06 NOTE — CHART NOTE - NSCHARTNOTEFT_GEN_A_CORE
I was called to pt's bedside by PA due to O2 desat. pt had been brushing teeth, then had coughing paroxysm/ choking on toothpaste. coughed up some toothpaste, felt better. desatted to 70's during coughing. After, pt required more O2, was observed on 15L NRB, could sat only 88-90% even w/ proning. Will transfer back to [facility] for hypoxia.

## 2021-01-06 NOTE — PROGRESS NOTE ADULT - ASSESSMENT
59yo male with pmhx of PE on eliquis 5 years ago, HLD, depression, congenital solitary kidney, vasovagal syncope presented with SOB, weakness, and fevers 10 days duration. Admitted for COVID-19 PNA.    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   
59yo male with pmhx of PE on eliquis 5 years ago, HLD, depression, congenital solitary kidney, vasovagal syncope presented with SOB, weakness, and fevers 10 days duration. Admitted for COVID-19 PNA.    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   
· Assessment	  61yo male with pmhx of PE on eliquis 5 years ago, HLD, depression, congenital solitary kidney, vasovagal syncope presented with SOB, weakness, and fevers 10 days duration. Admitted for COVID-19 PNA.    IMPRESSION;  Appears linically stable and feels well and has no complaints improved  COVID 19 with severe illness. SpO2 < 94% on RA and need for supplemental O2.   Pt is in the late inflammatory response phase ot the illness based on the onset of symptoms. ( 9 days )  Transaminitis : COVID-19 related  Mild elevation of the inflammatory markers   procalcitonin 0.18  , not suggestive of a bacterial PNA.  Ferritin 1290  CRP 5.3  ddimers 181    RECOMMENDATIONS;  Could finish course of steroids with po prednisone 40 mg q24 for a total of 10 days  Could transfer to [facility] for further management.   recall prn please 
59yo male with pmhx of PE on eliquis 5 years ago, HLD, depression, congenital solitary kidney, vasovagal syncope presented with SOB, weakness, and fevers 10 days duration. Admitted for COVID-19 PNA.    #Acute hypoxemic respiratory failure secondary to COVID-19 pneumonia  - COVID-19 PCR positive 1/2/20   - EKG: NSR  - 01/03 Chest X-ray: worsening b/l opacities, opacity of RML?    - Patient is saturating 93 % on 4L NC this AM  - Ddimer 181, pending crp, ferritin, procal   - Start dexamethasone 6mg IV QD for 10 days started 01/04;  - Start remdesivir 200mg IV first dose and 100mg for 4 doses started 01/04  - supportive measures: tylenol, robitussin, zofran   - eliquis for DVT prophylaxis   - Incentive spirometry at bedside  - Titrate supplemental O2 to maintain SpO2 92-96%  - Isolation precautions (contact, droplet, airborne)  - f/u ID for covid and for possible RML pneumonia?  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  - hx of factor V leiden according to patient, no records in [facility] as [facility]   - eliquis 5mg BID     #hyponatremia likely secondary to dehydration  - Na 131 on admission, s/p 2L NS 75cc/hr   - Na 136 today, fluids stopped    #HLD: c/w atorvastatin 40mg hs; will send lipid profile and A1C  #Depression: c/w welbutrin 300mg daily and lexapro 10mg daily  #Solitary kidney: Cr normal, monitor CMPs    DVT ppx: eliquis  GI ppx: not needed  Diet: DASH/TLC   Code status: FULL CODE  Dispo: from home; independent ADLs; acute     Pending: f/u ID for covid and ? PNA?, f/u cxr for RML opacification, f/u clinical improvement. f/u Na    [facility] [place] but [facility] send [facility] meds to [facility] pharmacy on [facility].

## 2021-01-06 NOTE — ED PROVIDER NOTE - ATTENDING CONTRIBUTION TO CARE
61 yo M pmh of HTN, HLD, DVT on eliquis presents with covid. Currently admitted to [facility]. Diagnosed on dec 26th. Was on nasal canula but today had episode of hypoxia to 77% RA. Transferred to the [facility] for further management. On arrival found to be 82% NRB. Denies any chest pain, no shortness of breath, no difficulty breathing.     CONSTITUTIONAL: Well-developed; well-nourished; in no acute distress.   SKIN: warm, dry  HEAD: Normocephalic; atraumatic.  EYES: PERRL, EOMI, no conjunctival erythema  ENT: No nasal discharge; airway clear.  NECK: Supple; non tender.  CARD: S1, S2 normal;  Regular rate and rhythm.   RESP: No wheezes, rales or rhonchi. speaking full sentences, non labored breathing.   ABD: soft non tender, non distended, no rebound or guarding  EXT: Normal ROM.  5/5 strength in all 4 extremities   LYMPH: No acute cervical adenopathy.  NEURO: Alert, oriented, grossly unremarkable. neurovascularly intact  PSYCH: Cooperative, appropriate.

## 2021-01-06 NOTE — ED ADULT NURSE NOTE - OBJECTIVE STATEMENT
Patient is a 60 year old male BIBA from [facility] for shortness of breath and hypoxia. Patient started coughing and desated- On NRB upon arrival sating 80%- No chest pain or fevers

## 2021-01-06 NOTE — ED PROVIDER NOTE - OBJECTIVE STATEMENT
Pt is a 61 yo M with PMH DVT on Eliquis, HTN, HLD, Covid+ who was transferred from [facility] for worsening hypoxia.  Pt has been on NC and desaturated to 77%.  On arrival, non rebreather, 81%. Pt denies any chest pain, SOB, dyspnea, back pain, n/v, abdominal pain. Pt is a 61 yo M with PMH DVT on Eliquis, HTN, HLD, Covid+ who was transferred from [facility] for worsening hypoxia and SOB.  Pt has been on NC and desaturated to 77%.  On arrival, non rebreather, 81%. Pt denies any chest pain, dyspnea, back pain, n/v, abdominal pain.

## 2021-01-06 NOTE — PROGRESS NOTE ADULT - SUBJECTIVE AND OBJECTIVE BOX
SUBJECTIVE  Patient is a 60y old Male who presents with a chief complaint of COVID-19 PNA (03 Jan 2021 23:34)  Currently admitted to medicine with the primary diagnosis of COVID-19    Today is hospital day 1d, and this morning he is sitting comfortably in bed, no acute distress. patient reports he is feeling okay and reports he was finally able to stay awake and eat for the first time in a while.    OBJECTIVE  PAST MEDICAL & SURGICAL HISTORY  Solitary kidney    Depression    Hyperlipidemia    PE (pulmonary thromboembolism)    No pertinent past surgical history      ALLERGIES:  No Known Allergies    MEDICATIONS:  STANDING MEDICATIONS  apixaban 5 milliGRAM(s) Oral every 12 hours  atorvastatin 40 milliGRAM(s) Oral at bedtime  buPROPion XL . 300 milliGRAM(s) Oral daily  chlorhexidine 4% Liquid 1 Application(s) Topical <User Schedule>  dexAMETHasone  Injectable 6 milliGRAM(s) IV Push daily  escitalopram 10 milliGRAM(s) Oral daily  remdesivir  IVPB   IV Intermittent   sodium chloride 0.9%. 1000 milliLiter(s) IV Continuous <Continuous>    PRN MEDICATIONS  acetaminophen   Tablet .. 650 milliGRAM(s) Oral every 6 hours PRN  guaifenesin/dextromethorphan  Syrup 10 milliLiter(s) Oral every 8 hours PRN  ondansetron Injectable 4 milliGRAM(s) IV Push every 8 hours PRN      VITAL SIGNS: Last 24 Hours  T(C): 36.9 (04 Jan 2021 07:38), Max: 36.9 (04 Jan 2021 07:38)  T(F): 98.5 (04 Jan 2021 07:38), Max: 98.5 (04 Jan 2021 07:38)  HR: 85 (04 Jan 2021 07:38) (85 - 113)  BP: 119/71 (04 Jan 2021 07:38) (119/71 - 134/86)  BP(mean): --  RR: 20 (04 Jan 2021 07:38) (18 - 20)  SpO2: 93% (04 Jan 2021 07:38) (92% - 94%)    LABS:                        13.7   5.52  )-----------( 196      ( 04 Jan 2021 07:24 )             41.5     01-04    136  |  100  |  15  ----------------------------<  206<H>  4.8   |  24  |  1.0    Ca    8.2<L>      04 Jan 2021 07:24  Mg     2.2     01-04    TPro  6.4  /  Alb  3.8  /  TBili  0.4  /  DBili  x   /  AST  71<H>  /  ALT  111<H>  /  AlkPhos  100  01-04    PT/INR - ( 04 Jan 2021 07:24 )   PT: 13.70 sec;   INR: 1.19 ratio         PTT - ( 04 Jan 2021 07:24 )  PTT:36.9 sec    Troponin T, Serum: <0.01 ng/mL (01-03-21 @ 21:09)      CARDIAC MARKERS ( 03 Jan 2021 21:09 )  x     / <0.01 ng/mL / x     / x     / x        D-Dimer Assay, Quantitative: 181 ng/mL DDU (01-03-21 @ 21:09)    RADIOLOGY:    < from: Xray Chest 1 View-PORTABLE IMMEDIATE (01.03.21 @ 22:00) >  Impression:    In comparison with the prior chest x-ray dated January 2, 2021 time 7:41 AM:    Moderate increase in the bilateral lung opacities.    < end of copied text >      PHYSICAL EXAM:    GENERAL: NAD, well-developed, AAOx3  HEENT:  Atraumatic, Normocephalic. EOMI, conjunctiva and sclera clear, No JVD  PULMONARY: Clear to auscultation bilaterally; No wheeze  CARDIOVASCULAR: Regular rate and rhythm; No murmurs, rubs, or gallops  GASTROINTESTINAL: Soft, Nontender, Nondistended  MUSCULOSKELETAL: No clubbing, cyanosis, or edema  NEUROLOGY: non-focal  SKIN: No rashes or lesions  
  SURINDERWEIROSALVA ESPARZA  60y, Male    All available historical data reviewed    OVERNIGHT EVENTS:  no fevers  feels well and has no complaints  93&% NC 4 LIT    ROS:  General: Denies rigors, nightsweats  HEENT: Denies headache, rhinorrhea, sore throat, eye pain  CV: Denies CP, palpitations  PULM: Denies wheezing, hemoptysis  GI: Denies hematemesis, hematochezia, melena  : Denies discharge, hematuria  MSK: Denies arthralgias, myalgias  SKIN: Denies rash, lesions  NEURO: Denies paresthesias, weakness  PSYCH: Denies depression, anxiety    VITALS:  T(F): 98.5, Max: 98.9 (01-04-21 @ 22:27)  HR: 88  BP: 129/77  RR: 19Vital Signs Last 24 Hrs  T(C): 36.9 (05 Jan 2021 08:00), Max: 37.2 (04 Jan 2021 22:27)  T(F): 98.5 (05 Jan 2021 08:00), Max: 98.9 (04 Jan 2021 22:27)  HR: 88 (05 Jan 2021 08:00) (82 - 90)  BP: 129/77 (05 Jan 2021 08:00) (117/55 - 131/75)  BP(mean): --  RR: 19 (05 Jan 2021 08:00) (18 - 21)  SpO2: 92% (05 Jan 2021 08:00) (92% - 96%)    TESTS & MEASUREMENTS:                        14.2   11.61 )-----------( 282      ( 05 Jan 2021 05:49 )             42.5     01-05    136  |  98  |  24<H>  ----------------------------<  201<H>  4.7   |  24  |  1.0    Ca    8.2<L>      05 Jan 2021 05:49  Mg     2.4     01-05    TPro  6.5  /  Alb  3.9  /  TBili  0.5  /  DBili  0.2  /  AST  79<H>  /  ALT  107<H>  /  AlkPhos  89  01-05    LIVER FUNCTIONS - ( 05 Jan 2021 05:49 )  Alb: 3.9 g/dL / Pro: 6.5 g/dL / ALK PHOS: 89 U/L / ALT: 107 U/L / AST: 79 U/L / GGT: x                   RADIOLOGY & ADDITIONAL TESTS:  Personal review of radiological diagnostics performed  Echo and EKG results noted when applicable.     MEDICATIONS:  acetaminophen   Tablet .. 650 milliGRAM(s) Oral every 6 hours PRN  apixaban 5 milliGRAM(s) Oral every 12 hours  atorvastatin 40 milliGRAM(s) Oral at bedtime  buPROPion XL . 300 milliGRAM(s) Oral daily  chlorhexidine 4% Liquid 1 Application(s) Topical <User Schedule>  dexAMETHasone  Injectable 6 milliGRAM(s) IV Push daily  escitalopram 10 milliGRAM(s) Oral daily  guaifenesin/dextromethorphan  Syrup 10 milliLiter(s) Oral every 8 hours PRN  ondansetron Injectable 4 milliGRAM(s) IV Push every 8 hours PRN      ANTIBIOTICS:    
SUBJECTIVE:    Patient is a 60y old Male who presents with a chief complaint of COVID-19 PNA (05 Jan 2021 08:36)    Currently admitted to medicine with the primary diagnosis of COVID-19       24 hour update:  Today is hospital day 2d. This morning he is resting comfortably in bed and reports no new issues or overnight events.      PAST MEDICAL & SURGICAL HISTORY  Solitary kidney    Depression    Hyperlipidemia    PE (pulmonary thromboembolism)    No pertinent past surgical history      SOCIAL HISTORY:    ALLERGIES:  No Known Allergies    MEDICATIONS:  STANDING MEDICATIONS  apixaban 5 milliGRAM(s) Oral every 12 hours  atorvastatin 40 milliGRAM(s) Oral at bedtime  buPROPion XL . 300 milliGRAM(s) Oral daily  chlorhexidine 4% Liquid 1 Application(s) Topical <User Schedule>  dexAMETHasone  Injectable 6 milliGRAM(s) IV Push daily  escitalopram 10 milliGRAM(s) Oral daily    PRN MEDICATIONS  acetaminophen   Tablet .. 650 milliGRAM(s) Oral every 6 hours PRN  guaifenesin/dextromethorphan  Syrup 10 milliLiter(s) Oral every 8 hours PRN  ondansetron Injectable 4 milliGRAM(s) IV Push every 8 hours PRN    VITALS:   T(F): 98.5  HR: 88  BP: 129/77  RR: 19  SpO2: 92%    PHYSICAL EXAM:  GENERAL: NAD, well-developed, AAOx3  HEENT:  Atraumatic, Normocephalic. EOMI, conjunctiva and sclera clear, No JVD  PULMONARY: Clear to auscultation bilaterally; No wheeze  CARDIOVASCULAR: Regular rate and rhythm; No murmurs, rubs, or gallops  GASTROINTESTINAL: Soft, Nontender, Nondistended  MUSCULOSKELETAL: No clubbing, cyanosis, or edema  NEUROLOGY: non-focal  SKIN: No rashes or lesions    LABS:                        14.2   11.61 )-----------( 282      ( 05 Jan 2021 05:49 )             42.5     136  |  98  |  24<H>  ----------------------------<  201<H>  4.7   |  24  |  1.0    Ca    8.2<L>      05 Jan 2021 05:49  Mg     2.4     01-05    TPro  6.5  /  Alb  3.9  /  TBili  0.5  /  DBili  0.2  /  AST  79<H>  /  ALT  107<H>  /  AlkPhos  89  01-05    PT/INR - ( 04 Jan 2021 07:24 )   PT: 13.70 sec;   INR: 1.19 ratio    PTT - ( 04 Jan 2021 07:24 )  PTT:36.9 sec    Troponin T, Serum: <0.01 ng/mL (01-03-21 @ 21:09)    IMAGING:  Xray Chest 1 View- PORTABLE-Urgent (Xray Chest 1 View- PORTABLE-Urgent .) (01.02.21 @ 22:03)  Impression:  Bilateral patchy opacities.    Xray Chest 1 View-PORTABLE IMMEDIATE (01.03.21 @ 22:00)  Impression:  In comparison with the prior chest x-ray dated January 2, 2021 time 7:41 AM:  Moderate increase in the bilateral lung opacities.    Xray Chest 1 View- PORTABLE-Routine (Xray Chest 1 View- PORTABLE-Routine .) (01.04.21 @ 14:49)  Impression:  Bilateral peripheral opacities have decreased. No pneumothorax or pleural effusion.            
SUBJECTIVE:    Patient is a 60y old Male who presents with a chief complaint of COVID-19 PNA (05 Jan 2021 08:36)    Currently admitted to medicine with the primary diagnosis of COVID-19       24 hour update:  Today is hospital day 2d. This morning he is resting comfortably in bed and reports no new issues or overnight events.      PAST MEDICAL & SURGICAL HISTORY  Solitary kidney    Depression    Hyperlipidemia    PE (pulmonary thromboembolism)    No pertinent past surgical history      SOCIAL HISTORY:    ALLERGIES:  No Known Allergies    MEDICATIONS:  STANDING MEDICATIONS  apixaban 5 milliGRAM(s) Oral every 12 hours  atorvastatin 40 milliGRAM(s) Oral at bedtime  buPROPion XL . 300 milliGRAM(s) Oral daily  chlorhexidine 4% Liquid 1 Application(s) Topical <User Schedule>  dexAMETHasone  Injectable 6 milliGRAM(s) IV Push daily  escitalopram 10 milliGRAM(s) Oral daily    PRN MEDICATIONS  acetaminophen   Tablet .. 650 milliGRAM(s) Oral every 6 hours PRN  guaifenesin/dextromethorphan  Syrup 10 milliLiter(s) Oral every 8 hours PRN  ondansetron Injectable 4 milliGRAM(s) IV Push every 8 hours PRN    VITALS:   T(F): 98.5  HR: 88  BP: 129/77  RR: 19  SpO2: 92%    PHYSICAL EXAM:  GENERAL: NAD, well-developed, AAOx3  HEENT:  Atraumatic, Normocephalic. EOMI, conjunctiva and sclera clear, No JVD  PULMONARY: Clear to auscultation bilaterally; No wheeze  CARDIOVASCULAR: Regular rate and rhythm; No murmurs, rubs, or gallops  GASTROINTESTINAL: Soft, Nontender, Nondistended  MUSCULOSKELETAL: No clubbing, cyanosis, or edema  NEUROLOGY: non-focal  SKIN: No rashes or lesions    LABS:                        14.2   11.61 )-----------( 282      ( 05 Jan 2021 05:49 )             42.5     136  |  98  |  24<H>  ----------------------------<  201<H>  4.7   |  24  |  1.0    Ca    8.2<L>      05 Jan 2021 05:49  Mg     2.4     01-05    TPro  6.5  /  Alb  3.9  /  TBili  0.5  /  DBili  0.2  /  AST  79<H>  /  ALT  107<H>  /  AlkPhos  89  01-05    PT/INR - ( 04 Jan 2021 07:24 )   PT: 13.70 sec;   INR: 1.19 ratio    PTT - ( 04 Jan 2021 07:24 )  PTT:36.9 sec    Troponin T, Serum: <0.01 ng/mL (01-03-21 @ 21:09)    IMAGING:  Xray Chest 1 View- PORTABLE-Urgent (Xray Chest 1 View- PORTABLE-Urgent .) (01.02.21 @ 22:03)  Impression:  Bilateral patchy opacities.    Xray Chest 1 View-PORTABLE IMMEDIATE (01.03.21 @ 22:00)  Impression:  In comparison with the prior chest x-ray dated January 2, 2021 time 7:41 AM:  Moderate increase in the bilateral lung opacities.    Xray Chest 1 View- PORTABLE-Routine (Xray Chest 1 View- PORTABLE-Routine .) (01.04.21 @ 14:49)  Impression:  Bilateral peripheral opacities have decreased. No pneumothorax or pleural effusion.

## 2021-01-06 NOTE — ED ADULT NURSE NOTE - CHIEF COMPLAINT QUOTE
BIBA from [facility] difficulty breathing oxygen saturation 83% on 15 L [facility]

## 2021-01-06 NOTE — CHART NOTE - NSCHARTNOTEFT_GEN_A_CORE
Attempted to call Meghan to have a conversation regarding patient's current clinical condition, diagnosis, therapy, further options for care, and plan at 1:45PM but was not reachable   Will attempt later

## 2021-01-07 PROBLEM — E78.5 HYPERLIPIDEMIA, UNSPECIFIED: Chronic | Status: ACTIVE | Noted: 2021-01-04

## 2021-01-07 PROBLEM — Q60.0 RENAL AGENESIS, UNILATERAL: Chronic | Status: ACTIVE | Noted: 2021-01-04

## 2021-01-07 PROBLEM — I26.99 OTHER PULMONARY EMBOLISM WITHOUT ACUTE COR PULMONALE: Chronic | Status: ACTIVE | Noted: 2021-01-03

## 2021-01-07 PROBLEM — F32.9 MAJOR DEPRESSIVE DISORDER, SINGLE EPISODE, UNSPECIFIED: Chronic | Status: ACTIVE | Noted: 2021-01-04

## 2021-01-07 LAB
ALBUMIN SERPL ELPH-MCNC: 3.6 G/DL — SIGNIFICANT CHANGE UP (ref 3.5–5.2)
ALBUMIN SERPL ELPH-MCNC: 3.6 G/DL — SIGNIFICANT CHANGE UP (ref 3.5–5.2)
ALP SERPL-CCNC: 75 U/L — SIGNIFICANT CHANGE UP (ref 30–115)
ALP SERPL-CCNC: 78 U/L — SIGNIFICANT CHANGE UP (ref 30–115)
ALT FLD-CCNC: 73 U/L — HIGH (ref 0–41)
ALT FLD-CCNC: 77 U/L — HIGH (ref 0–41)
ANION GAP SERPL CALC-SCNC: 8 MMOL/L — SIGNIFICANT CHANGE UP (ref 7–14)
ANION GAP SERPL CALC-SCNC: 9 MMOL/L — SIGNIFICANT CHANGE UP (ref 7–14)
ANISOCYTOSIS BLD QL: SLIGHT — SIGNIFICANT CHANGE UP
APTT BLD: 28.9 SEC — SIGNIFICANT CHANGE UP (ref 27–39.2)
AST SERPL-CCNC: 47 U/L — HIGH (ref 0–41)
AST SERPL-CCNC: 49 U/L — HIGH (ref 0–41)
BASE EXCESS BLDV CALC-SCNC: 4.1 MMOL/L — HIGH (ref -2–2)
BASOPHILS # BLD AUTO: 0 K/UL — SIGNIFICANT CHANGE UP (ref 0–0.2)
BASOPHILS # BLD AUTO: 0.06 K/UL — SIGNIFICANT CHANGE UP (ref 0–0.2)
BASOPHILS NFR BLD AUTO: 0 % — SIGNIFICANT CHANGE UP (ref 0–1)
BASOPHILS NFR BLD AUTO: 0.4 % — SIGNIFICANT CHANGE UP (ref 0–1)
BILIRUB SERPL-MCNC: 0.4 MG/DL — SIGNIFICANT CHANGE UP (ref 0.2–1.2)
BILIRUB SERPL-MCNC: 0.4 MG/DL — SIGNIFICANT CHANGE UP (ref 0.2–1.2)
BUN SERPL-MCNC: 25 MG/DL — HIGH (ref 10–20)
BUN SERPL-MCNC: 25 MG/DL — HIGH (ref 10–20)
CA-I SERPL-SCNC: 1.13 MMOL/L — SIGNIFICANT CHANGE UP (ref 1.12–1.3)
CALCIUM SERPL-MCNC: 8.3 MG/DL — LOW (ref 8.5–10.1)
CALCIUM SERPL-MCNC: 8.3 MG/DL — LOW (ref 8.5–10.1)
CHLORIDE SERPL-SCNC: 100 MMOL/L — SIGNIFICANT CHANGE UP (ref 98–110)
CHLORIDE SERPL-SCNC: 103 MMOL/L — SIGNIFICANT CHANGE UP (ref 98–110)
CO2 SERPL-SCNC: 27 MMOL/L — SIGNIFICANT CHANGE UP (ref 17–32)
CO2 SERPL-SCNC: 27 MMOL/L — SIGNIFICANT CHANGE UP (ref 17–32)
CREAT SERPL-MCNC: 0.9 MG/DL — SIGNIFICANT CHANGE UP (ref 0.7–1.5)
CREAT SERPL-MCNC: 1 MG/DL — SIGNIFICANT CHANGE UP (ref 0.7–1.5)
CRP SERPL-MCNC: 1.65 MG/DL — HIGH (ref 0–0.4)
D DIMER BLD IA.RAPID-MCNC: 250 NG/ML DDU — HIGH (ref 0–230)
EOSINOPHIL # BLD AUTO: 0 K/UL — SIGNIFICANT CHANGE UP (ref 0–0.7)
EOSINOPHIL # BLD AUTO: 0 K/UL — SIGNIFICANT CHANGE UP (ref 0–0.7)
EOSINOPHIL NFR BLD AUTO: 0 % — SIGNIFICANT CHANGE UP (ref 0–8)
EOSINOPHIL NFR BLD AUTO: 0 % — SIGNIFICANT CHANGE UP (ref 0–8)
ERYTHROCYTE [SEDIMENTATION RATE] IN BLOOD: 70 MM/HR — HIGH (ref 0–10)
FERRITIN SERPL-MCNC: 1276 NG/ML — HIGH (ref 30–400)
GAS PNL BLDV: 138 MMOL/L — SIGNIFICANT CHANGE UP (ref 136–145)
GAS PNL BLDV: SIGNIFICANT CHANGE UP
GIANT PLATELETS BLD QL SMEAR: PRESENT — SIGNIFICANT CHANGE UP
GLUCOSE SERPL-MCNC: 182 MG/DL — HIGH (ref 70–99)
GLUCOSE SERPL-MCNC: 186 MG/DL — HIGH (ref 70–99)
HCO3 BLDV-SCNC: 30 MMOL/L — HIGH (ref 22–29)
HCT VFR BLD CALC: 40.1 % — LOW (ref 42–52)
HCT VFR BLD CALC: 40.9 % — LOW (ref 42–52)
HCT VFR BLDA CALC: 40.9 % — SIGNIFICANT CHANGE UP (ref 34–44)
HGB BLD CALC-MCNC: 13.4 G/DL — LOW (ref 14–18)
HGB BLD-MCNC: 13.3 G/DL — LOW (ref 14–18)
HGB BLD-MCNC: 13.4 G/DL — LOW (ref 14–18)
IMM GRANULOCYTES NFR BLD AUTO: 3.1 % — HIGH (ref 0.1–0.3)
INR BLD: 1.31 RATIO — HIGH (ref 0.65–1.3)
LACTATE BLDV-MCNC: 1.2 MMOL/L — SIGNIFICANT CHANGE UP (ref 0.5–1.6)
LDH SERPL L TO P-CCNC: 409 U/L — HIGH (ref 50–242)
LYMPHOCYTES # BLD AUTO: 1.08 K/UL — LOW (ref 1.2–3.4)
LYMPHOCYTES # BLD AUTO: 1.31 K/UL — SIGNIFICANT CHANGE UP (ref 1.2–3.4)
LYMPHOCYTES # BLD AUTO: 7.6 % — LOW (ref 20.5–51.1)
LYMPHOCYTES # BLD AUTO: 7.8 % — LOW (ref 20.5–51.1)
MAGNESIUM SERPL-MCNC: 2.7 MG/DL — HIGH (ref 1.8–2.4)
MANUAL SMEAR VERIFICATION: SIGNIFICANT CHANGE UP
MCHC RBC-ENTMCNC: 30.5 PG — SIGNIFICANT CHANGE UP (ref 27–31)
MCHC RBC-ENTMCNC: 30.7 PG — SIGNIFICANT CHANGE UP (ref 27–31)
MCHC RBC-ENTMCNC: 32.5 G/DL — SIGNIFICANT CHANGE UP (ref 32–37)
MCHC RBC-ENTMCNC: 33.4 G/DL — SIGNIFICANT CHANGE UP (ref 32–37)
MCV RBC AUTO: 91.8 FL — SIGNIFICANT CHANGE UP (ref 80–94)
MCV RBC AUTO: 93.8 FL — SIGNIFICANT CHANGE UP (ref 80–94)
MICROCYTES BLD QL: SLIGHT — SIGNIFICANT CHANGE UP
MONOCYTES # BLD AUTO: 1.06 K/UL — HIGH (ref 0.1–0.6)
MONOCYTES # BLD AUTO: 1.45 K/UL — HIGH (ref 0.1–0.6)
MONOCYTES NFR BLD AUTO: 7.5 % — SIGNIFICANT CHANGE UP (ref 1.7–9.3)
MONOCYTES NFR BLD AUTO: 8.6 % — SIGNIFICANT CHANGE UP (ref 1.7–9.3)
MYELOCYTES NFR BLD: 1.7 % — HIGH (ref 0–0)
NEUTROPHILS # BLD AUTO: 11.48 K/UL — HIGH (ref 1.4–6.5)
NEUTROPHILS # BLD AUTO: 13.18 K/UL — HIGH (ref 1.4–6.5)
NEUTROPHILS NFR BLD AUTO: 78.4 % — HIGH (ref 42.2–75.2)
NEUTROPHILS NFR BLD AUTO: 81.4 % — HIGH (ref 42.2–75.2)
NRBC # BLD: 0 /100 WBCS — SIGNIFICANT CHANGE UP (ref 0–0)
PCO2 BLDV: 46 MMHG — SIGNIFICANT CHANGE UP (ref 41–51)
PH BLDV: 7.41 — SIGNIFICANT CHANGE UP (ref 7.26–7.43)
PLAT MORPH BLD: ABNORMAL
PLATELET # BLD AUTO: 356 K/UL — SIGNIFICANT CHANGE UP (ref 130–400)
PLATELET # BLD AUTO: 365 K/UL — SIGNIFICANT CHANGE UP (ref 130–400)
PO2 BLDV: 32 MMHG — SIGNIFICANT CHANGE UP (ref 20–40)
POLYCHROMASIA BLD QL SMEAR: SLIGHT — SIGNIFICANT CHANGE UP
POTASSIUM BLDV-SCNC: 4.9 MMOL/L — SIGNIFICANT CHANGE UP (ref 3.3–5.6)
POTASSIUM SERPL-MCNC: 5 MMOL/L — SIGNIFICANT CHANGE UP (ref 3.5–5)
POTASSIUM SERPL-MCNC: 5.3 MMOL/L — HIGH (ref 3.5–5)
POTASSIUM SERPL-SCNC: 5 MMOL/L — SIGNIFICANT CHANGE UP (ref 3.5–5)
POTASSIUM SERPL-SCNC: 5.3 MMOL/L — HIGH (ref 3.5–5)
PROCALCITONIN SERPL-MCNC: 0.06 NG/ML — SIGNIFICANT CHANGE UP (ref 0.02–0.1)
PROMYELOCYTES # FLD: 0.9 % — HIGH (ref 0–0)
PROT SERPL-MCNC: 5.9 G/DL — LOW (ref 6–8)
PROT SERPL-MCNC: 6 G/DL — SIGNIFICANT CHANGE UP (ref 6–8)
PROTHROM AB SERPL-ACNC: 15.1 SEC — HIGH (ref 9.95–12.87)
RBC # BLD: 4.36 M/UL — LOW (ref 4.7–6.1)
RBC # BLD: 4.37 M/UL — LOW (ref 4.7–6.1)
RBC # FLD: 11.9 % — SIGNIFICANT CHANGE UP (ref 11.5–14.5)
RBC # FLD: 11.9 % — SIGNIFICANT CHANGE UP (ref 11.5–14.5)
RBC BLD AUTO: ABNORMAL
SAO2 % BLDV: 56 % — SIGNIFICANT CHANGE UP
SODIUM SERPL-SCNC: 136 MMOL/L — SIGNIFICANT CHANGE UP (ref 135–146)
SODIUM SERPL-SCNC: 138 MMOL/L — SIGNIFICANT CHANGE UP (ref 135–146)
VARIANT LYMPHS # BLD: 2.6 % — SIGNIFICANT CHANGE UP (ref 0–5)
WBC # BLD: 14.12 K/UL — HIGH (ref 4.8–10.8)
WBC # BLD: 16.81 K/UL — HIGH (ref 4.8–10.8)
WBC # FLD AUTO: 14.12 K/UL — HIGH (ref 4.8–10.8)
WBC # FLD AUTO: 16.81 K/UL — HIGH (ref 4.8–10.8)

## 2021-01-07 PROCEDURE — 71045 X-RAY EXAM CHEST 1 VIEW: CPT | Mod: 26

## 2021-01-07 PROCEDURE — 93010 ELECTROCARDIOGRAM REPORT: CPT

## 2021-01-07 PROCEDURE — 99223 1ST HOSP IP/OBS HIGH 75: CPT

## 2021-01-07 PROCEDURE — 93970 EXTREMITY STUDY: CPT | Mod: 26

## 2021-01-07 RX ORDER — DEXAMETHASONE 0.5 MG/5ML
6 ELIXIR ORAL DAILY
Refills: 0 | Status: COMPLETED | OUTPATIENT
Start: 2021-01-07 | End: 2021-01-11

## 2021-01-07 RX ORDER — CHLORHEXIDINE GLUCONATE 213 G/1000ML
1 SOLUTION TOPICAL
Refills: 0 | Status: DISCONTINUED | OUTPATIENT
Start: 2021-01-07 | End: 2021-01-21

## 2021-01-07 RX ORDER — AMPICILLIN SODIUM AND SULBACTAM SODIUM 250; 125 MG/ML; MG/ML
3 INJECTION, POWDER, FOR SUSPENSION INTRAMUSCULAR; INTRAVENOUS EVERY 6 HOURS
Refills: 0 | Status: DISCONTINUED | OUTPATIENT
Start: 2021-01-07 | End: 2021-01-09

## 2021-01-07 RX ORDER — APIXABAN 2.5 MG/1
5 TABLET, FILM COATED ORAL EVERY 12 HOURS
Refills: 0 | Status: DISCONTINUED | OUTPATIENT
Start: 2021-01-07 | End: 2021-01-21

## 2021-01-07 RX ORDER — BUPROPION HYDROCHLORIDE 150 MG/1
300 TABLET, EXTENDED RELEASE ORAL DAILY
Refills: 0 | Status: DISCONTINUED | OUTPATIENT
Start: 2021-01-07 | End: 2021-01-21

## 2021-01-07 RX ORDER — PANTOPRAZOLE SODIUM 20 MG/1
40 TABLET, DELAYED RELEASE ORAL
Refills: 0 | Status: DISCONTINUED | OUTPATIENT
Start: 2021-01-07 | End: 2021-01-21

## 2021-01-07 RX ORDER — ATORVASTATIN CALCIUM 80 MG/1
40 TABLET, FILM COATED ORAL DAILY
Refills: 0 | Status: DISCONTINUED | OUTPATIENT
Start: 2021-01-07 | End: 2021-01-21

## 2021-01-07 RX ORDER — GUAIFENESIN/DEXTROMETHORPHAN 600MG-30MG
5 TABLET, EXTENDED RELEASE 12 HR ORAL
Refills: 0 | Status: DISCONTINUED | OUTPATIENT
Start: 2021-01-07 | End: 2021-01-21

## 2021-01-07 RX ORDER — AMPICILLIN SODIUM AND SULBACTAM SODIUM 250; 125 MG/ML; MG/ML
3 INJECTION, POWDER, FOR SUSPENSION INTRAMUSCULAR; INTRAVENOUS ONCE
Refills: 0 | Status: COMPLETED | OUTPATIENT
Start: 2021-01-07 | End: 2021-01-07

## 2021-01-07 RX ORDER — ESCITALOPRAM OXALATE 10 MG/1
10 TABLET, FILM COATED ORAL DAILY
Refills: 0 | Status: DISCONTINUED | OUTPATIENT
Start: 2021-01-07 | End: 2021-01-21

## 2021-01-07 RX ORDER — AMPICILLIN SODIUM AND SULBACTAM SODIUM 250; 125 MG/ML; MG/ML
INJECTION, POWDER, FOR SUSPENSION INTRAMUSCULAR; INTRAVENOUS
Refills: 0 | Status: DISCONTINUED | OUTPATIENT
Start: 2021-01-07 | End: 2021-01-09

## 2021-01-07 RX ADMIN — BUPROPION HYDROCHLORIDE 300 MILLIGRAM(S): 150 TABLET, EXTENDED RELEASE ORAL at 10:28

## 2021-01-07 RX ADMIN — AMPICILLIN SODIUM AND SULBACTAM SODIUM 200 GRAM(S): 250; 125 INJECTION, POWDER, FOR SUSPENSION INTRAMUSCULAR; INTRAVENOUS at 21:15

## 2021-01-07 RX ADMIN — AMPICILLIN SODIUM AND SULBACTAM SODIUM 200 GRAM(S): 250; 125 INJECTION, POWDER, FOR SUSPENSION INTRAMUSCULAR; INTRAVENOUS at 16:53

## 2021-01-07 RX ADMIN — ESCITALOPRAM OXALATE 10 MILLIGRAM(S): 10 TABLET, FILM COATED ORAL at 10:28

## 2021-01-07 RX ADMIN — CHLORHEXIDINE GLUCONATE 1 APPLICATION(S): 213 SOLUTION TOPICAL at 06:16

## 2021-01-07 RX ADMIN — APIXABAN 5 MILLIGRAM(S): 2.5 TABLET, FILM COATED ORAL at 06:16

## 2021-01-07 RX ADMIN — APIXABAN 5 MILLIGRAM(S): 2.5 TABLET, FILM COATED ORAL at 16:53

## 2021-01-07 RX ADMIN — PANTOPRAZOLE SODIUM 40 MILLIGRAM(S): 20 TABLET, DELAYED RELEASE ORAL at 06:16

## 2021-01-07 RX ADMIN — AMPICILLIN SODIUM AND SULBACTAM SODIUM 200 GRAM(S): 250; 125 INJECTION, POWDER, FOR SUSPENSION INTRAMUSCULAR; INTRAVENOUS at 10:27

## 2021-01-07 RX ADMIN — Medication 6 MILLIGRAM(S): at 06:16

## 2021-01-07 RX ADMIN — ATORVASTATIN CALCIUM 40 MILLIGRAM(S): 80 TABLET, FILM COATED ORAL at 10:27

## 2021-01-07 NOTE — H&P ADULT - HISTORY OF PRESENT ILLNESS
`61yo male with pmhx of PE on eliquis 5 years ago secondary to factor 5 leiden, HLD, depression, congenital solitary kidney, vasovagal syncope presented from [facility] for hypoxia.  Pt. presented to [facility] on 1-3-21 with SOB, weakness, and fevers 10 days duration, and found covid positive. Initially was satting well on 2 L NC to 94% Chest xray showed b/l opacities. Pt. was seen by ID, was started on steroids and was transferred to [facility] 1-5-21.  Today Pt. reports working with PT and "over doing it" and then while brushing teeth tonight had eisode of coughing/ choking on toothpaste last a few minutes resulting in ppulse ox decreasing to 70s. Pt. was placed on non-rebreather improved to 88-90% and was sent to ED for further eval.  In ED Pt. satting 81% on non-rebreather and placed in High flow NC satting 94%. Pt. reports he has no symptoms and that prior covid related symptoms to have resolved.   Other vitals significant for Александр 100.2, WBC increased 11>16 from two days prior to admission. DDimer 250. Transaminits downtrending. Lactate 1.2. chest xray appears unchanged pending official read.

## 2021-01-07 NOTE — H&P ADULT - ATTENDING COMMENTS
I saw and evaluated patient  by bedside, independently, hypoxic on high flow oxygen tx , generalized body weakness.  All labs, radiology studies, VS was reviewed  Vital Signs Last 24 Hrs  T(C): 36.4 (07 Jan 2021 12:38), Max: 37.9 (06 Jan 2021 23:08)  T(F): 97.5 (07 Jan 2021 12:38), Max: 100.2 (06 Jan 2021 23:08)  HR: 90 (07 Jan 2021 12:38) (77 - 93)  BP: 131/65 (07 Jan 2021 12:38) (112/64 - 140/77)  RR: 18 (07 Jan 2021 12:38) (18 - 22)  SpO2: 93% (07 Jan 2021 12:38) (74% - 97%)  GENERAL: NAD, AAOX3, patient is sitting  comfortably in bed  HEENT: AT, NC, PERRLA, SUPPLE, NO JVD, NO CB  LUNG: decreased breath sounds  B/L  CVS: normal S1, S2, RRR, NO M/G/R  ABDOMEN: soft, bowel sounds present, normoactive in all 4 quadrants, non-tender, non-distended  EXT: no E/C/C, positive PP b/l extremities  NEURO: no acute focal neurological deficits, mild generalized body weakness  SKIN: no rash, no ecchymosis                          13.3   14.12 )-----------( 356      ( 07 Jan 2021 08:14 )             40.9   01-07    138  |  103  |  25<H>  ----------------------------<  186<H>  5.3<H>   |  27  |  0.9    Ca    8.3<L>      07 Jan 2021 08:14  Mg     2.7     01-07    TPro  5.9<L>  /  Alb  3.6  /  TBili  0.4  /  DBili  x   /  AST  47<H>  /  ALT  73<H>  /  AlkPhos  75  01-07      I have reviewed the resident's note and agree with documented findings and  plan of care.    a/p:  Patient is a 61y/o male with pmhx of PE on eliquis 5 years ago secondary to factor 5 leiden, HLD, depression, congenital solitary kidney, vasovagal syncope presented from [facility] for hypoxia.    #Acute hypoxic respiratory failure secondary to COVID severe viral pneumonia with superimposed chemical pneumonitis vs. bacterial pneumonia  -patient currently on High flow NC satting 94%.   -CXR- worsening b/l pulmonary inflitrates  -start on IV Unasyn 3 grams IV every 6 hours, obtain blood  and sputum cxs, monitor CBC  -IV decadron to complete 10 days course, obtain covid ab's  - DDimer 250- will obtain LE duplex  -maintain airborne/contact/droplet isolation  - mild acute Transaminitis (secondary to covid) downtrending  -monitor  inflammatory markers every 48 hours     #h/o Pulmonary embolus secondary to factor V leiden  - hx of factor V leiden according to patient though no records in [facility] --> pt follows up at [facility]  - cont eliquis 5mg BID     #h/o Dyslipidemia - c/w atorvastatin  # h/o Depression- cont welbutrin 300mg daily and lexapro 10mg daily  #h/o  Solitary kidney - Creatinine stable

## 2021-01-07 NOTE — CHART NOTE - NSCHARTNOTEFT_GEN_A_CORE
I made rounds today with the treatment team including the hospitalist, residents,  nurses and  and discussed the patient's current medical status and discharge  planning needs, and reviewed the chart.    T(C): 36.4 (01-07-21 @ 12:38), Max: 37.9 (01-06-21 @ 23:08)  HR: 90 (01-07-21 @ 12:38) (77 - 93)  BP: 131/65 (01-07-21 @ 12:38) (112/64 - 140/77)  RR: 18 (01-07-21 @ 12:38) (18 - 22)  SpO2: 93% (01-07-21 @ 12:38) (74% - 97%)          I reached out to the patient's health care proxy/ responsible family member-           [     ]  I reached                                     and discussed the patient's medical condition,                   family concerns, and discharge planning           [     ]  I left a message with family               [   x  ]  I personally participated in rounds with the medical team and my resident and discussed the case. My resident reached                   family member/ HCP     wife, Sheeba                           under my direction and supervision  and we reviewed the conversation.          [     ]  My resident left a message with family under my direction and supervision           [     ]   My resident attended medical rounds and called the family                [   x   ]    The following was discussed:  The patient's medical status over the past 24 hrs was reviewed, as well as oxygen needs and medication changes and labs. Transferred from [facility] after coughing episode and choking on toothpaste and desturation. Now on HFNC O2 60/90. Increased WBC. On unasyn. Chest x-ray unchanged. Will get cough med.         [   x   ]   The following concerns were raised: none          [     ] I spent 5-10 minutes on the above discussing medical issues with team members and family and/ or my resident    [    x ] I spent 11-20 minutes on the above discussing medical issues with team members and family and/ or my resident    [     ] I spent 21-30 minutes on the above discussing medical issues with team members and family and/ or my resident

## 2021-01-07 NOTE — PROGRESS NOTE ADULT - ASSESSMENT
ASSESSMENT  61yo male with pmhx of Factor V Leiden complicated with PE on Eliquis, HLD, depression, congenital solitary kidney, vasovagal syncope presented from [facility] for hypoxia    IMPRESSION  #COVID-19 infection  - Severe, likely in late inflammatory phase  - D-Dimer Assay, Quantitative: 250: Manufacturers recommended Cut off for VTE is 230 ng/ml D-DU ng/mL DDU (01.07.21 @ 00:25)  - C-Reactive Protein, Serum: 5.31 mg/dL (01.03.21 @ 21:09)  - LDH Pending   - Ferritin, Serum: 1290 ng/mL (01.03.21 @ 21:09)  - Procalcitonin, Serum: 0.18  (01.03.21 @ 21:09)    #Factor V Leiden  #Congenital Solitary Kidney    RECOMMENDATIONS      - Dexamethasone 6mg daily   - A/C per primary team - on Eliquis  - Prone positioning if possible    Please call or message on [employer] Teams if with any questions.  Spectra [phone]    This is a preliminary incomplete pended note, all final recommendations to follow after interview and examination of the patient.

## 2021-01-07 NOTE — PATIENT PROFILE ADULT - HOW PATIENT ADDRESSED, PROFILE
Wife left a voicemail on RN triage line to schedule an appointment with Dr Sami De Leon. Called wife back. No answer. Left her a voicemail and asked her to call back to schedule appointment.
Aldo

## 2021-01-07 NOTE — CONSULT NOTE ADULT - ASSESSMENT
ASSESSMENT  61yo male with pmhx of Factor V Leiden complicated with PE on Eliquis, HLD, depression, congenital solitary kidney, vasovagal syncope presented from [facility] for hypoxia    IMPRESSION  #COVID-19 infection  - Severe, likely in late inflammatory phase  - D-Dimer Assay, Quantitative: 250: Manufacturers recommended Cut off for VTE is 230 ng/ml D-DU ng/mL DDU (01.07.21 @ 00:25)  - C-Reactive Protein, Serum: 5.31 mg/dL (01.03.21 @ 21:09)  - LDH Pending   - Ferritin, Serum: 1290 ng/mL (01.03.21 @ 21:09)  - Procalcitonin, Serum: 0.18  (01.03.21 @ 21:09)    #Factor V Leiden  #Congenital Solitary Kidney    RECOMMENDATIONS  - follow-up procalcitonin - if stable/negative, can give dose of tocilizumab 400 mg x 1  - ok to continue unasyn while awaiting repeat procalcitonin   - Dexamethasone 6mg daily   - A/C per primary team - on Eliquis  - Prone positioning if possible    Please call or message on [employer] Teams if with any questions.  Spectra [phone]

## 2021-01-07 NOTE — H&P ADULT - ASSESSMENT
`61yo male with pmhx of PE on eliquis 5 years ago secondary to factor 5 leiden, HLD, depression, congenital solitary kidney, vasovagal syncope presented from [facility] for hypoxia.    #Acute hypoxic respiratory failure secondary to COVID (day 14 since initial symptoms which have resolved ) with possible superimposed chemical pneumonitis  -was 94% on 2 L NC then tonight after episode of choking/coughing toothpaste required more oxygen>In ED Pt. satting 81% on non-rebreather and placed in High flow NC satting 94%. Pt. feels well has no symptoms  -xray appears unchanged WBC increased 11>16 from two days prior to admission ( although on dexa) lactate 1.2  - DDimer 250- will obtain LE duplex  -Transaminitis (secondary to covid) downtrending  -repeat inflammatory markers, f/u official xray read  -dexa for now  -f/u procal no abx for now    #Pulmonary embolus secondary to factor V leiden  - hx of factor V leiden according to patient though no records in [facility] --> pt follows up at [facility]  - cont eliquis 5mg BID     #Transaminitis 2/2 covid - improving  #HLD - c/w atorvastatin  #Depression- cont welbutrin 300mg daily and lexapro 10mg daily  #Hx Solitary kidney - Creatinine stable     DVT ppx: eliquis  GI ppx: pantoprazole  Diet: DASH/TLC   Code status: FULL CODE  Dispo: from home; independent ADLs; acute

## 2021-01-07 NOTE — H&P ADULT - NSHPLABSRESULTS_GEN_ALL_CORE
13.4   16.81 )-----------( 365      ( 07 Jan 2021 00:25 )             40.1     01-07    136  |  100  |  25<H>  ----------------------------<  182<H>  5.0   |  27  |  1.0    Ca    8.3<L>      07 Jan 2021 00:25  Mg     2.4     01-05    TPro  6.0  /  Alb  3.6  /  TBili  0.4  /  DBili  x   /  AST  49<H>  /  ALT  77<H>  /  AlkPhos  78  01-07    < from: Xray Chest 1 View- PORTABLE-Routine (Xray Chest 1 View- PORTABLE-Routine .) (01.04.21 @ 14:49) >      Impression:    Bilateral peripheral opacities have decreased. No pneumothorax or pleural effusion.    < end of copied text >

## 2021-01-07 NOTE — CONSULT NOTE ADULT - SUBJECTIVE AND OBJECTIVE BOX
ROSALVA DEAN  60y, Male  Allergy: No Known Allergies      CHIEF COMPLAINT: hypoxia/covid (07 Jan 2021 01:32)      LOS      HPI:  `59yo male with pmhx of PE on eliquis 5 years ago secondary to factor 5 leiden, HLD, depression, congenital solitary kidney, vasovagal syncope presented from [facility] for hypoxia.  Pt. presented to [facility] on 1-3-21 with SOB, weakness, and fevers 10 days duration, and found covid positive. Initially was satting well on 2 L NC to 94% Chest xray showed b/l opacities. Pt. was seen by ID, was started on steroids and was transferred to [facility] 1-5-21.  Today Pt. reports working with PT and "over doing it" and then while brushing teeth tonight had eisode of coughing/ choking on toothpaste last a few minutes resulting in ppulse ox decreasing to 70s. Pt. was placed on non-rebreather improved to 88-90% and was sent to ED for further eval.  In ED Pt. satting 81% on non-rebreather and placed in High flow NC satting 94%. Pt. reports he has no symptoms and that prior covid related symptoms to have resolved.   Other vitals significant for Александр 100.2, WBC increased 11>16 from two days prior to admission. DDimer 250. Transaminits downtrending. Lactate 1.2. chest xray appears unchanged pending official read.  (07 Jan 2021 01:32)      INFECTIOUS DISEASE HISTORY:  History as above  Admitted to [place] on 1/3/21.  Had fevers 10 days prior to initial admission.   Transferred to [facility], but worsening hypoxia.   Currently on HFNC.   Initially on 2L NC on initial admission, but has had worsening hypoxia over the past few days, requiring 5L prior to transfer.     PAST MEDICAL & SURGICAL HISTORY:  Solitary kidney  Depression  Hyperlipidemia  PE (pulmonary thromboembolism)  No pertinent past surgical history        FAMILY HISTORY  Family history of hyperlipidemia    Family history of hyperlipidemia    Family history of cardiac disorder in father    No pertinent family history in first degree relatives        SOCIAL HISTORY  Social History:  denies smoking and Etoh use (07 Jan 2021 01:32)        ROS  General: Denies rigors, nightsweats  HEENT: Denies headache, rhinorrhea, sore throat, eye pain  CV: Denies CP, palpitations  PULM: Denies wheezing, hemoptysis  GI: Denies hematemesis, hematochezia, melena  : Denies discharge, hematuria  MSK: Denies arthralgias, myalgias  SKIN: Denies rash, lesions  NEURO: Denies paresthesias, weakness  PSYCH: Denies depression, anxiety    VITALS:  T(F): 97.3, Max: 100.2 (01-06-21 @ 23:08)  HR: 85  BP: 140/77  RR: 18Vital Signs Last 24 Hrs  T(C): 36.3 (07 Jan 2021 04:44), Max: 37.9 (06 Jan 2021 23:08)  T(F): 97.3 (07 Jan 2021 04:44), Max: 100.2 (06 Jan 2021 23:08)  HR: 85 (07 Jan 2021 05:47) (77 - 93)  BP: 140/77 (07 Jan 2021 04:44) (112/64 - 140/77)  BP(mean): --  RR: 18 (07 Jan 2021 04:44) (18 - 22)  SpO2: 96% (07 Jan 2021 05:47) (74% - 97%)    PHYSICAL EXAM:  Gen: NAD, resting in bed  HEENT: Normocephalic, atraumatic  Neck: supple, no lymphadenopathy  CV: Regular rate & regular rhythm  Lungs: decreased BS at bases, no fremitus  Abdomen: Soft, BS present  Ext: Warm, well perfused  Neuro: non focal, awake  Skin: no rash, no erythema  Lines: no phlebitis    TESTS & MEASUREMENTS:                        13.4   16.81 )-----------( 365      ( 07 Jan 2021 00:25 )             40.1     01-07    136  |  100  |  25<H>  ----------------------------<  182<H>  5.0   |  27  |  1.0    Ca    8.3<L>      07 Jan 2021 00:25    TPro  6.0  /  Alb  3.6  /  TBili  0.4  /  DBili  x   /  AST  49<H>  /  ALT  77<H>  /  AlkPhos  78  01-07    eGFR if Non African American: 81 mL/min/1.73M2 (01-07-21 @ 00:25)  eGFR if : 94 mL/min/1.73M2 (01-07-21 @ 00:25)    LIVER FUNCTIONS - ( 07 Jan 2021 00:25 )  Alb: 3.6 g/dL / Pro: 6.0 g/dL / ALK PHOS: 78 U/L / ALT: 77 U/L / AST: 49 U/L / GGT: x                 Blood Gas Venous - Lactate: 1.2 mmoL/L (01-07-21 @ 01:18)  Blood Gas Venous - Lactate: 1.5 mmoL/L (01-03-21 @ 21:13)      INFECTIOUS DISEASES TESTING  Procalcitonin, Serum: 0.18 ng/mL (01-03-21 @ 21:09)  COVID-19 PCR: Detected (01-02-21 @ 08:00)      RADIOLOGY & ADDITIONAL TESTS:  I have personally reviewed the last Chest xray  CXR      CT      CARDIOLOGY TESTING  12 Lead ECG:   Ventricular Rate 110 BPM    Atrial Rate 110 BPM    P-R Interval 132 ms    QRS Duration 162 ms    Q-T Interval 384 ms    QTC Calculation(Bazett) 519 ms    P Axis 54 degrees    R Axis -65 degrees    T Axis 109 degrees    Diagnosis Line Sinus tachycardia with  Blocked Premature atrial complexes  Atrial-sensed ventricular-paced rhythm with occasional atrial-paced complexes  Abnormal ECG    Confirmed by Syd Corbin ([id number]) on 1/7/2021 7:04:00 AM (01-07-21 @ 01:16)  12 Lead ECG:   Ventricular Rate 100 BPM    Atrial Rate 100 BPM    P-R Interval 158 ms    QRS Duration 80 ms    Q-T Interval 340 ms    QTC Calculation(Bazett) 438 ms    P Axis 30 degrees    R Axis 98 degrees    T Axis 13 degrees    Diagnosis Line Normal sinus rhythm  Rightward axis  Poor R wave progression  Possible Inferior infarct , age undetermined  Abnormal ECG    Confirmed by Ro Gambino MD ([id number]) on 1/4/2021 8:02:42 AM (01-03-21 @ 21:08)      MEDICATIONS  apixaban 5 Oral every 12 hours  atorvastatin Oral Tab/Cap - Peds 40 Oral daily  buPROPion XL . 300 Oral daily  chlorhexidine 4% Liquid 1 Topical <User Schedule>  dexAMETHasone  Injectable 6 IV Push daily  escitalopram 10 Oral daily  pantoprazole    Tablet 40 Oral before breakfast      Weight  Weight (kg): 91 (01-07-21 @ 04:44)    ANTIBIOTICS:      ALLERGIES:  No Known Allergies

## 2021-01-08 LAB
ANION GAP SERPL CALC-SCNC: 10 MMOL/L — SIGNIFICANT CHANGE UP (ref 7–14)
BASOPHILS # BLD AUTO: 0.11 K/UL — SIGNIFICANT CHANGE UP (ref 0–0.2)
BASOPHILS NFR BLD AUTO: 0.7 % — SIGNIFICANT CHANGE UP (ref 0–1)
BUN SERPL-MCNC: 23 MG/DL — HIGH (ref 10–20)
CALCIUM SERPL-MCNC: 8.5 MG/DL — SIGNIFICANT CHANGE UP (ref 8.5–10.1)
CHLORIDE SERPL-SCNC: 103 MMOL/L — SIGNIFICANT CHANGE UP (ref 98–110)
CO2 SERPL-SCNC: 26 MMOL/L — SIGNIFICANT CHANGE UP (ref 17–32)
CREAT SERPL-MCNC: 1 MG/DL — SIGNIFICANT CHANGE UP (ref 0.7–1.5)
EOSINOPHIL # BLD AUTO: 0 K/UL — SIGNIFICANT CHANGE UP (ref 0–0.7)
EOSINOPHIL NFR BLD AUTO: 0 % — SIGNIFICANT CHANGE UP (ref 0–8)
FERRITIN SERPL-MCNC: 1163 NG/ML — HIGH (ref 30–400)
GLUCOSE SERPL-MCNC: 148 MG/DL — HIGH (ref 70–99)
HCT VFR BLD CALC: 41 % — LOW (ref 42–52)
HGB BLD-MCNC: 13.4 G/DL — LOW (ref 14–18)
IMM GRANULOCYTES NFR BLD AUTO: 4.4 % — HIGH (ref 0.1–0.3)
LYMPHOCYTES # BLD AUTO: 1.54 K/UL — SIGNIFICANT CHANGE UP (ref 1.2–3.4)
LYMPHOCYTES # BLD AUTO: 10.4 % — LOW (ref 20.5–51.1)
MAGNESIUM SERPL-MCNC: 2.6 MG/DL — HIGH (ref 1.8–2.4)
MCHC RBC-ENTMCNC: 30.5 PG — SIGNIFICANT CHANGE UP (ref 27–31)
MCHC RBC-ENTMCNC: 32.7 G/DL — SIGNIFICANT CHANGE UP (ref 32–37)
MCV RBC AUTO: 93.2 FL — SIGNIFICANT CHANGE UP (ref 80–94)
MONOCYTES # BLD AUTO: 1.21 K/UL — HIGH (ref 0.1–0.6)
MONOCYTES NFR BLD AUTO: 8.2 % — SIGNIFICANT CHANGE UP (ref 1.7–9.3)
NEUTROPHILS # BLD AUTO: 11.31 K/UL — HIGH (ref 1.4–6.5)
NEUTROPHILS NFR BLD AUTO: 76.3 % — HIGH (ref 42.2–75.2)
NRBC # BLD: 0 /100 WBCS — SIGNIFICANT CHANGE UP (ref 0–0)
PLATELET # BLD AUTO: 379 K/UL — SIGNIFICANT CHANGE UP (ref 130–400)
POTASSIUM SERPL-MCNC: 5.2 MMOL/L — HIGH (ref 3.5–5)
POTASSIUM SERPL-SCNC: 5.2 MMOL/L — HIGH (ref 3.5–5)
PROCALCITONIN SERPL-MCNC: 0.05 NG/ML — SIGNIFICANT CHANGE UP (ref 0.02–0.1)
RBC # BLD: 4.4 M/UL — LOW (ref 4.7–6.1)
RBC # FLD: 11.9 % — SIGNIFICANT CHANGE UP (ref 11.5–14.5)
SARS-COV-2 IGG SERPL QL IA: POSITIVE
SARS-COV-2 IGM SERPL IA-ACNC: 10.3 INDEX — HIGH
SODIUM SERPL-SCNC: 139 MMOL/L — SIGNIFICANT CHANGE UP (ref 135–146)
WBC # BLD: 14.82 K/UL — HIGH (ref 4.8–10.8)
WBC # FLD AUTO: 14.82 K/UL — HIGH (ref 4.8–10.8)

## 2021-01-08 PROCEDURE — 99233 SBSQ HOSP IP/OBS HIGH 50: CPT

## 2021-01-08 RX ORDER — TOCILIZUMAB 20 MG/ML
400 INJECTION, SOLUTION, CONCENTRATE INTRAVENOUS ONCE
Refills: 0 | Status: COMPLETED | OUTPATIENT
Start: 2021-01-08 | End: 2021-01-08

## 2021-01-08 RX ADMIN — BUPROPION HYDROCHLORIDE 300 MILLIGRAM(S): 150 TABLET, EXTENDED RELEASE ORAL at 10:44

## 2021-01-08 RX ADMIN — Medication 5 MILLILITER(S): at 07:50

## 2021-01-08 RX ADMIN — APIXABAN 5 MILLIGRAM(S): 2.5 TABLET, FILM COATED ORAL at 16:55

## 2021-01-08 RX ADMIN — Medication 6 MILLIGRAM(S): at 05:57

## 2021-01-08 RX ADMIN — ATORVASTATIN CALCIUM 40 MILLIGRAM(S): 80 TABLET, FILM COATED ORAL at 10:44

## 2021-01-08 RX ADMIN — PANTOPRAZOLE SODIUM 40 MILLIGRAM(S): 20 TABLET, DELAYED RELEASE ORAL at 05:56

## 2021-01-08 RX ADMIN — APIXABAN 5 MILLIGRAM(S): 2.5 TABLET, FILM COATED ORAL at 05:56

## 2021-01-08 RX ADMIN — ESCITALOPRAM OXALATE 10 MILLIGRAM(S): 10 TABLET, FILM COATED ORAL at 10:44

## 2021-01-08 RX ADMIN — AMPICILLIN SODIUM AND SULBACTAM SODIUM 200 GRAM(S): 250; 125 INJECTION, POWDER, FOR SUSPENSION INTRAMUSCULAR; INTRAVENOUS at 10:44

## 2021-01-08 RX ADMIN — AMPICILLIN SODIUM AND SULBACTAM SODIUM 200 GRAM(S): 250; 125 INJECTION, POWDER, FOR SUSPENSION INTRAMUSCULAR; INTRAVENOUS at 05:56

## 2021-01-08 RX ADMIN — AMPICILLIN SODIUM AND SULBACTAM SODIUM 200 GRAM(S): 250; 125 INJECTION, POWDER, FOR SUSPENSION INTRAMUSCULAR; INTRAVENOUS at 23:08

## 2021-01-08 RX ADMIN — AMPICILLIN SODIUM AND SULBACTAM SODIUM 200 GRAM(S): 250; 125 INJECTION, POWDER, FOR SUSPENSION INTRAMUSCULAR; INTRAVENOUS at 17:00

## 2021-01-08 RX ADMIN — CHLORHEXIDINE GLUCONATE 1 APPLICATION(S): 213 SOLUTION TOPICAL at 05:57

## 2021-01-08 RX ADMIN — TOCILIZUMAB 100 MILLIGRAM(S): 20 INJECTION, SOLUTION, CONCENTRATE INTRAVENOUS at 11:30

## 2021-01-08 NOTE — PROGRESS NOTE ADULT - ATTENDING COMMENTS
I saw and evaluated patient  by bedside, patient remains very weak, dyspneic with minimal activity, on high flow oxygen tx, c/o mild cough, tolerating diet well.    All labs, radiology studies, VS was reviewed  I have reviewed the resident's note and agree with documented findings and  plan of care.  a/p:  Patient is a 59y/o male with pmhx of PE on eliquis 5 years ago secondary to factor 5 leiden, HLD, depression, congenital solitary kidney, vasovagal syncope presented from [facility] for hypoxia.    #Acute hypoxic respiratory failure secondary to COVID severe viral pneumonia with superimposed chemical pneumonitis vs. bacterial pneumonia  -patient is in late inflammatory phase , covid ab's- positive  -patient currently on High flow NC satting 91-  94%. -CXR- extensive  b/l pulmonary inflitrates  -started on IV Unasyn 3 grams IV every 6 hours,  blood   cxs - negative, f/up  sputum cxs, monitor CBC  -IV decadron to complete 10 days course,     - DDimer 250-  LE duplex negative   -maintain airborne/contact/droplet isolation  - mild acute Transaminitis (secondary to covid) downtrending  -monitor  inflammatory markers every 48 hours     #h/o Pulmonary embolus secondary to factor V leiden  - hx of factor V leiden according to patient though no records in [facility] --> pt follows up at [facility]  - cont eliquis 5mg BID     #h/o Dyslipidemia - c/w atorvastatin  # h/o Depression- cont welbutrin 300mg daily and lexapro 10mg daily  #h/o  Solitary kidney - Creatinine stable .     #Progress Note Handoff: Patient remains on high flow oxygen tx., monitor inflammatory markers, monitor CBC  Family discussion: medical plan of tx. was d/w patient's wive( she asked for covering physician to call her daily for patient's medical condition updates) Disposition: Home__once patient medically stable

## 2021-01-09 LAB
ALBUMIN SERPL ELPH-MCNC: 3.4 G/DL — LOW (ref 3.5–5.2)
ALP SERPL-CCNC: 68 U/L — SIGNIFICANT CHANGE UP (ref 30–115)
ALT FLD-CCNC: 61 U/L — HIGH (ref 0–41)
ANION GAP SERPL CALC-SCNC: 8 MMOL/L — SIGNIFICANT CHANGE UP (ref 7–14)
AST SERPL-CCNC: 31 U/L — SIGNIFICANT CHANGE UP (ref 0–41)
BASOPHILS # BLD AUTO: 0.07 K/UL — SIGNIFICANT CHANGE UP (ref 0–0.2)
BASOPHILS NFR BLD AUTO: 0.5 % — SIGNIFICANT CHANGE UP (ref 0–1)
BILIRUB DIRECT SERPL-MCNC: 0.2 MG/DL — SIGNIFICANT CHANGE UP (ref 0–0.2)
BILIRUB INDIRECT FLD-MCNC: 0.4 MG/DL — SIGNIFICANT CHANGE UP (ref 0.2–1.2)
BILIRUB SERPL-MCNC: 0.6 MG/DL — SIGNIFICANT CHANGE UP (ref 0.2–1.2)
BUN SERPL-MCNC: 25 MG/DL — HIGH (ref 10–20)
CALCIUM SERPL-MCNC: 8.1 MG/DL — LOW (ref 8.5–10.1)
CHLORIDE SERPL-SCNC: 104 MMOL/L — SIGNIFICANT CHANGE UP (ref 98–110)
CO2 SERPL-SCNC: 27 MMOL/L — SIGNIFICANT CHANGE UP (ref 17–32)
CREAT SERPL-MCNC: 0.9 MG/DL — SIGNIFICANT CHANGE UP (ref 0.7–1.5)
CRP SERPL-MCNC: 0.76 MG/DL — HIGH (ref 0–0.4)
D DIMER BLD IA.RAPID-MCNC: 590 NG/ML DDU — HIGH (ref 0–230)
EOSINOPHIL # BLD AUTO: 0.01 K/UL — SIGNIFICANT CHANGE UP (ref 0–0.7)
EOSINOPHIL NFR BLD AUTO: 0.1 % — SIGNIFICANT CHANGE UP (ref 0–8)
GLUCOSE SERPL-MCNC: 147 MG/DL — HIGH (ref 70–99)
HCT VFR BLD CALC: 42.5 % — SIGNIFICANT CHANGE UP (ref 42–52)
HGB BLD-MCNC: 14.3 G/DL — SIGNIFICANT CHANGE UP (ref 14–18)
IMM GRANULOCYTES NFR BLD AUTO: 5.2 % — HIGH (ref 0.1–0.3)
LYMPHOCYTES # BLD AUTO: 1.35 K/UL — SIGNIFICANT CHANGE UP (ref 1.2–3.4)
LYMPHOCYTES # BLD AUTO: 9.3 % — LOW (ref 20.5–51.1)
MAGNESIUM SERPL-MCNC: 2.7 MG/DL — HIGH (ref 1.8–2.4)
MCHC RBC-ENTMCNC: 31 PG — SIGNIFICANT CHANGE UP (ref 27–31)
MCHC RBC-ENTMCNC: 33.6 G/DL — SIGNIFICANT CHANGE UP (ref 32–37)
MCV RBC AUTO: 92.2 FL — SIGNIFICANT CHANGE UP (ref 80–94)
MONOCYTES # BLD AUTO: 0.75 K/UL — HIGH (ref 0.1–0.6)
MONOCYTES NFR BLD AUTO: 5.2 % — SIGNIFICANT CHANGE UP (ref 1.7–9.3)
NEUTROPHILS # BLD AUTO: 11.61 K/UL — HIGH (ref 1.4–6.5)
NEUTROPHILS NFR BLD AUTO: 79.7 % — HIGH (ref 42.2–75.2)
NRBC # BLD: 0 /100 WBCS — SIGNIFICANT CHANGE UP (ref 0–0)
PLATELET # BLD AUTO: 415 K/UL — HIGH (ref 130–400)
POTASSIUM SERPL-MCNC: 5.2 MMOL/L — HIGH (ref 3.5–5)
POTASSIUM SERPL-SCNC: 5.2 MMOL/L — HIGH (ref 3.5–5)
PROT SERPL-MCNC: 6 G/DL — SIGNIFICANT CHANGE UP (ref 6–8)
RBC # BLD: 4.61 M/UL — LOW (ref 4.7–6.1)
RBC # FLD: 11.9 % — SIGNIFICANT CHANGE UP (ref 11.5–14.5)
SODIUM SERPL-SCNC: 139 MMOL/L — SIGNIFICANT CHANGE UP (ref 135–146)
WBC # BLD: 14.54 K/UL — HIGH (ref 4.8–10.8)
WBC # FLD AUTO: 14.54 K/UL — HIGH (ref 4.8–10.8)

## 2021-01-09 PROCEDURE — 99233 SBSQ HOSP IP/OBS HIGH 50: CPT

## 2021-01-09 RX ORDER — SODIUM ZIRCONIUM CYCLOSILICATE 10 G/10G
5 POWDER, FOR SUSPENSION ORAL
Refills: 0 | Status: COMPLETED | OUTPATIENT
Start: 2021-01-09 | End: 2021-01-11

## 2021-01-09 RX ORDER — FUROSEMIDE 40 MG
40 TABLET ORAL ONCE
Refills: 0 | Status: COMPLETED | OUTPATIENT
Start: 2021-01-09 | End: 2021-01-09

## 2021-01-09 RX ADMIN — SODIUM ZIRCONIUM CYCLOSILICATE 5 GRAM(S): 10 POWDER, FOR SUSPENSION ORAL at 18:56

## 2021-01-09 RX ADMIN — PANTOPRAZOLE SODIUM 40 MILLIGRAM(S): 20 TABLET, DELAYED RELEASE ORAL at 05:32

## 2021-01-09 RX ADMIN — APIXABAN 5 MILLIGRAM(S): 2.5 TABLET, FILM COATED ORAL at 17:08

## 2021-01-09 RX ADMIN — Medication 40 MILLIGRAM(S): at 10:36

## 2021-01-09 RX ADMIN — ATORVASTATIN CALCIUM 40 MILLIGRAM(S): 80 TABLET, FILM COATED ORAL at 10:36

## 2021-01-09 RX ADMIN — AMPICILLIN SODIUM AND SULBACTAM SODIUM 200 GRAM(S): 250; 125 INJECTION, POWDER, FOR SUSPENSION INTRAMUSCULAR; INTRAVENOUS at 05:32

## 2021-01-09 RX ADMIN — AMPICILLIN SODIUM AND SULBACTAM SODIUM 200 GRAM(S): 250; 125 INJECTION, POWDER, FOR SUSPENSION INTRAMUSCULAR; INTRAVENOUS at 10:36

## 2021-01-09 RX ADMIN — SODIUM ZIRCONIUM CYCLOSILICATE 5 GRAM(S): 10 POWDER, FOR SUSPENSION ORAL at 12:38

## 2021-01-09 RX ADMIN — APIXABAN 5 MILLIGRAM(S): 2.5 TABLET, FILM COATED ORAL at 05:32

## 2021-01-09 RX ADMIN — ESCITALOPRAM OXALATE 10 MILLIGRAM(S): 10 TABLET, FILM COATED ORAL at 10:36

## 2021-01-09 RX ADMIN — BUPROPION HYDROCHLORIDE 300 MILLIGRAM(S): 150 TABLET, EXTENDED RELEASE ORAL at 10:36

## 2021-01-09 RX ADMIN — CHLORHEXIDINE GLUCONATE 1 APPLICATION(S): 213 SOLUTION TOPICAL at 05:32

## 2021-01-09 RX ADMIN — Medication 6 MILLIGRAM(S): at 05:32

## 2021-01-09 NOTE — PROGRESS NOTE ADULT - ASSESSMENT
61y/o male with pmhx of PE on eliquis 5 years ago secondary to factor 5 leiden, HLD, depression, congenital solitary kidney, vasovagal syncope presented from [facility] for hypoxia.    # Acute hypoxic respiratory failure secondary to COVID severe viral pneumonia with superimposed chemical pneumonitis, less likely bacterial pneumonia  -patient is in late inflammatory phase , covid ab's- positive  -patient currently on High flow NC, taper down as possible  -CXR- extensive  b/l pulmonary inflitrates  -started on IV Unasyn 3 grams IV every 6 hours,  blood   cxs - negative, f/up  sputum cxs, monitor CBC  -IV decadron to complete 10 days course,     - DDimer 250-  LE duplex negative   -maintain airborne/contact/droplet isolation    # Transaminitis (secondary to covid) downtrending    #h/o Pulmonary embolus secondary to factor V leiden  - hx of factor V leiden according to patient though no records in [facility] --> pt follows up at [facility]  - cont eliquis 5mg BID     #h/o Dyslipidemia - c/w atorvastatin  # h/o Depression- cont welbutrin 300mg daily and lexapro 10mg daily  #h/o  Solitary kidney - Creatinine stable .     Pt remains high risk for deterioration and intubation.     Full code.   59y/o male with pmhx of PE on eliquis 5 years ago secondary to factor 5 leiden, HLD, depression, congenital solitary kidney, vasovagal syncope presented from [facility] for hypoxia.    # Acute hypoxic respiratory failure secondary to COVID severe viral pneumonia with superimposed chemical pneumonitis, less likely bacterial pneumonia  - patient is in late inflammatory phase , covid ab's- positive - no RDV  - patient currently on High flow NC, taper down as possible  - CXR- extensive  b/l pulmonary inflitrates  - will dc IV Unasyn, no signs of bacterial infection, procal low  - IV decadron to complete 10 days course  - s/p Toci x1 on 1/8  - DDimer 250-  LE duplex negative   - maintain airborne/contact/droplet isolation    # Transaminitis (secondary to covid) downtrending    # Hyperkalemia - Lokelma given, meds reviewed repeat BMP tomorrow    # h/o Pulmonary embolus secondary to factor V leiden  - hx of factor V leiden according to patient though no records in [facility] --> pt follows up at [facility]  - cont eliquis 5mg BID     # h/o Dyslipidemia - c/w atorvastatin  # h/o Depression- cont welbutrin 300mg daily and lexapro 10mg daily  # h/o  Solitary kidney - Creatinine stable .     Pt remains high risk for deterioration and intubation.     Full code.

## 2021-01-10 LAB
ALBUMIN SERPL ELPH-MCNC: 3.6 G/DL — SIGNIFICANT CHANGE UP (ref 3.5–5.2)
ALP SERPL-CCNC: 63 U/L — SIGNIFICANT CHANGE UP (ref 30–115)
ALT FLD-CCNC: 52 U/L — HIGH (ref 0–41)
ANION GAP SERPL CALC-SCNC: 13 MMOL/L — SIGNIFICANT CHANGE UP (ref 7–14)
AST SERPL-CCNC: 26 U/L — SIGNIFICANT CHANGE UP (ref 0–41)
BASOPHILS # BLD AUTO: 0.07 K/UL — SIGNIFICANT CHANGE UP (ref 0–0.2)
BASOPHILS NFR BLD AUTO: 0.5 % — SIGNIFICANT CHANGE UP (ref 0–1)
BILIRUB DIRECT SERPL-MCNC: 0.2 MG/DL — SIGNIFICANT CHANGE UP (ref 0–0.2)
BILIRUB INDIRECT FLD-MCNC: 0.6 MG/DL — SIGNIFICANT CHANGE UP (ref 0.2–1.2)
BILIRUB SERPL-MCNC: 0.8 MG/DL — SIGNIFICANT CHANGE UP (ref 0.2–1.2)
BUN SERPL-MCNC: 33 MG/DL — HIGH (ref 10–20)
CALCIUM SERPL-MCNC: 8.5 MG/DL — SIGNIFICANT CHANGE UP (ref 8.5–10.1)
CHLORIDE SERPL-SCNC: 101 MMOL/L — SIGNIFICANT CHANGE UP (ref 98–110)
CO2 SERPL-SCNC: 26 MMOL/L — SIGNIFICANT CHANGE UP (ref 17–32)
CREAT SERPL-MCNC: 1.1 MG/DL — SIGNIFICANT CHANGE UP (ref 0.7–1.5)
EOSINOPHIL # BLD AUTO: 0.15 K/UL — SIGNIFICANT CHANGE UP (ref 0–0.7)
EOSINOPHIL NFR BLD AUTO: 1 % — SIGNIFICANT CHANGE UP (ref 0–8)
GLUCOSE SERPL-MCNC: 121 MG/DL — HIGH (ref 70–99)
HCT VFR BLD CALC: 43.9 % — SIGNIFICANT CHANGE UP (ref 42–52)
HGB BLD-MCNC: 14.8 G/DL — SIGNIFICANT CHANGE UP (ref 14–18)
IMM GRANULOCYTES NFR BLD AUTO: 5.5 % — HIGH (ref 0.1–0.3)
LYMPHOCYTES # BLD AUTO: 1.86 K/UL — SIGNIFICANT CHANGE UP (ref 1.2–3.4)
LYMPHOCYTES # BLD AUTO: 12 % — LOW (ref 20.5–51.1)
MAGNESIUM SERPL-MCNC: 2.9 MG/DL — HIGH (ref 1.8–2.4)
MCHC RBC-ENTMCNC: 31 PG — SIGNIFICANT CHANGE UP (ref 27–31)
MCHC RBC-ENTMCNC: 33.7 G/DL — SIGNIFICANT CHANGE UP (ref 32–37)
MCV RBC AUTO: 91.8 FL — SIGNIFICANT CHANGE UP (ref 80–94)
MONOCYTES # BLD AUTO: 1.06 K/UL — HIGH (ref 0.1–0.6)
MONOCYTES NFR BLD AUTO: 6.9 % — SIGNIFICANT CHANGE UP (ref 1.7–9.3)
NEUTROPHILS # BLD AUTO: 11.48 K/UL — HIGH (ref 1.4–6.5)
NEUTROPHILS NFR BLD AUTO: 74.1 % — SIGNIFICANT CHANGE UP (ref 42.2–75.2)
NRBC # BLD: 0 /100 WBCS — SIGNIFICANT CHANGE UP (ref 0–0)
PLATELET # BLD AUTO: 405 K/UL — HIGH (ref 130–400)
POTASSIUM SERPL-MCNC: 4.7 MMOL/L — SIGNIFICANT CHANGE UP (ref 3.5–5)
POTASSIUM SERPL-SCNC: 4.7 MMOL/L — SIGNIFICANT CHANGE UP (ref 3.5–5)
PROT SERPL-MCNC: 6 G/DL — SIGNIFICANT CHANGE UP (ref 6–8)
RBC # BLD: 4.78 M/UL — SIGNIFICANT CHANGE UP (ref 4.7–6.1)
RBC # FLD: 11.9 % — SIGNIFICANT CHANGE UP (ref 11.5–14.5)
SODIUM SERPL-SCNC: 140 MMOL/L — SIGNIFICANT CHANGE UP (ref 135–146)
WBC # BLD: 15.47 K/UL — HIGH (ref 4.8–10.8)
WBC # FLD AUTO: 15.47 K/UL — HIGH (ref 4.8–10.8)

## 2021-01-10 PROCEDURE — 99233 SBSQ HOSP IP/OBS HIGH 50: CPT

## 2021-01-10 RX ADMIN — APIXABAN 5 MILLIGRAM(S): 2.5 TABLET, FILM COATED ORAL at 16:36

## 2021-01-10 RX ADMIN — CHLORHEXIDINE GLUCONATE 1 APPLICATION(S): 213 SOLUTION TOPICAL at 05:35

## 2021-01-10 RX ADMIN — ESCITALOPRAM OXALATE 10 MILLIGRAM(S): 10 TABLET, FILM COATED ORAL at 10:54

## 2021-01-10 RX ADMIN — SODIUM ZIRCONIUM CYCLOSILICATE 5 GRAM(S): 10 POWDER, FOR SUSPENSION ORAL at 05:35

## 2021-01-10 RX ADMIN — ATORVASTATIN CALCIUM 40 MILLIGRAM(S): 80 TABLET, FILM COATED ORAL at 10:53

## 2021-01-10 RX ADMIN — BUPROPION HYDROCHLORIDE 300 MILLIGRAM(S): 150 TABLET, EXTENDED RELEASE ORAL at 10:54

## 2021-01-10 RX ADMIN — APIXABAN 5 MILLIGRAM(S): 2.5 TABLET, FILM COATED ORAL at 05:34

## 2021-01-10 RX ADMIN — PANTOPRAZOLE SODIUM 40 MILLIGRAM(S): 20 TABLET, DELAYED RELEASE ORAL at 05:35

## 2021-01-10 RX ADMIN — SODIUM ZIRCONIUM CYCLOSILICATE 5 GRAM(S): 10 POWDER, FOR SUSPENSION ORAL at 18:22

## 2021-01-10 RX ADMIN — Medication 6 MILLIGRAM(S): at 05:34

## 2021-01-10 NOTE — PROGRESS NOTE ADULT - ATTENDING COMMENTS
Pt feels the same, still very anxious and short of breath. FiO2 decreased to 60%, 60L.  no other complaints.     Vitals, labs reviewed.    GENERAL: NAD  PULMONARY/CHEST: decreased breath sounds, basilar crackles   CARDIOVASC: Regular rate and rhythm; No murmurs  GI/ABDOMEN: Soft, Nontender, Nondistended; Bowel sounds present  EXTREMITIES:  2+ Peripheral Pulses, No clubbing, cyanosis, or edema, no deformity. No calf tenderness b/l.  NERVOUS SYSTEM:  Alert & Oriented X3, no focal deficit     61y/o male with pmhx of PE on eliquis 5 years ago secondary to factor 5 leiden, HLD, depression, congenital solitary kidney, vasovagal syncope presented from [facility] for hypoxia.    # Acute hypoxic respiratory failure secondary to COVID severe viral pneumonia with superimposed chemical pneumonitis, less likely bacterial pneumonia  - patient is in late inflammatory phase , covid ab's- positive - no RDV  - patient currently on High flow NC, taper down as possible  - CXR- extensive  b/l pulmonary inflitrates  - IV decadron to complete 10 days course  - s/p Toci x1 on 1/8  - DDimer 250-  LE duplex negative   - maintain airborne/contact/droplet isolation    # Transaminitis (secondary to covid) downtrending    # Hyperkalemia - improved after Lokelma     # h/o Pulmonary embolus secondary to factor V leiden  - hx of factor V leiden according to patient though no records in [facility] --> pt follows up at [facility]  - cont eliquis 5mg BID     # h/o Dyslipidemia - c/w atorvastatin  # h/o Depression- cont welbutrin 300mg daily and lexapro 10mg daily  # h/o  Solitary kidney - Creatinine stable .     Full code.    Remains acute.

## 2021-01-11 LAB
ALBUMIN SERPL ELPH-MCNC: 3.1 G/DL — LOW (ref 3.5–5.2)
ALP SERPL-CCNC: 56 U/L — SIGNIFICANT CHANGE UP (ref 30–115)
ALT FLD-CCNC: 43 U/L — HIGH (ref 0–41)
ANION GAP SERPL CALC-SCNC: 16 MMOL/L — HIGH (ref 7–14)
AST SERPL-CCNC: 27 U/L — SIGNIFICANT CHANGE UP (ref 0–41)
BASOPHILS # BLD AUTO: 0.05 K/UL — SIGNIFICANT CHANGE UP (ref 0–0.2)
BASOPHILS NFR BLD AUTO: 0.3 % — SIGNIFICANT CHANGE UP (ref 0–1)
BILIRUB DIRECT SERPL-MCNC: 0.2 MG/DL — SIGNIFICANT CHANGE UP (ref 0–0.2)
BILIRUB INDIRECT FLD-MCNC: 0.8 MG/DL — SIGNIFICANT CHANGE UP (ref 0.2–1.2)
BILIRUB SERPL-MCNC: 1 MG/DL — SIGNIFICANT CHANGE UP (ref 0.2–1.2)
BUN SERPL-MCNC: 43 MG/DL — HIGH (ref 10–20)
CALCIUM SERPL-MCNC: 7.9 MG/DL — LOW (ref 8.5–10.1)
CHLORIDE SERPL-SCNC: 103 MMOL/L — SIGNIFICANT CHANGE UP (ref 98–110)
CO2 SERPL-SCNC: 21 MMOL/L — SIGNIFICANT CHANGE UP (ref 17–32)
CREAT SERPL-MCNC: 1.1 MG/DL — SIGNIFICANT CHANGE UP (ref 0.7–1.5)
EOSINOPHIL # BLD AUTO: 0.48 K/UL — SIGNIFICANT CHANGE UP (ref 0–0.7)
EOSINOPHIL NFR BLD AUTO: 3.1 % — SIGNIFICANT CHANGE UP (ref 0–8)
FERRITIN SERPL-MCNC: 1362 NG/ML — HIGH (ref 30–400)
GLUCOSE SERPL-MCNC: 181 MG/DL — HIGH (ref 70–99)
HCT VFR BLD CALC: 43.4 % — SIGNIFICANT CHANGE UP (ref 42–52)
HGB BLD-MCNC: 14.5 G/DL — SIGNIFICANT CHANGE UP (ref 14–18)
IMM GRANULOCYTES NFR BLD AUTO: 4.2 % — HIGH (ref 0.1–0.3)
LYMPHOCYTES # BLD AUTO: 13.1 % — LOW (ref 20.5–51.1)
LYMPHOCYTES # BLD AUTO: 2.01 K/UL — SIGNIFICANT CHANGE UP (ref 1.2–3.4)
MAGNESIUM SERPL-MCNC: 2.5 MG/DL — HIGH (ref 1.8–2.4)
MCHC RBC-ENTMCNC: 30.5 PG — SIGNIFICANT CHANGE UP (ref 27–31)
MCHC RBC-ENTMCNC: 33.4 G/DL — SIGNIFICANT CHANGE UP (ref 32–37)
MCV RBC AUTO: 91.2 FL — SIGNIFICANT CHANGE UP (ref 80–94)
MONOCYTES # BLD AUTO: 1.04 K/UL — HIGH (ref 0.1–0.6)
MONOCYTES NFR BLD AUTO: 6.8 % — SIGNIFICANT CHANGE UP (ref 1.7–9.3)
NEUTROPHILS # BLD AUTO: 11.1 K/UL — HIGH (ref 1.4–6.5)
NEUTROPHILS NFR BLD AUTO: 72.5 % — SIGNIFICANT CHANGE UP (ref 42.2–75.2)
NRBC # BLD: 0 /100 WBCS — SIGNIFICANT CHANGE UP (ref 0–0)
PLATELET # BLD AUTO: 369 K/UL — SIGNIFICANT CHANGE UP (ref 130–400)
POTASSIUM SERPL-MCNC: 4.2 MMOL/L — SIGNIFICANT CHANGE UP (ref 3.5–5)
POTASSIUM SERPL-SCNC: 4.2 MMOL/L — SIGNIFICANT CHANGE UP (ref 3.5–5)
PROT SERPL-MCNC: 5.8 G/DL — LOW (ref 6–8)
RBC # BLD: 4.76 M/UL — SIGNIFICANT CHANGE UP (ref 4.7–6.1)
RBC # FLD: 11.8 % — SIGNIFICANT CHANGE UP (ref 11.5–14.5)
SODIUM SERPL-SCNC: 140 MMOL/L — SIGNIFICANT CHANGE UP (ref 135–146)
WBC # BLD: 15.32 K/UL — HIGH (ref 4.8–10.8)
WBC # FLD AUTO: 15.32 K/UL — HIGH (ref 4.8–10.8)

## 2021-01-11 PROCEDURE — 99233 SBSQ HOSP IP/OBS HIGH 50: CPT

## 2021-01-11 PROCEDURE — 71045 X-RAY EXAM CHEST 1 VIEW: CPT | Mod: 26

## 2021-01-11 RX ORDER — TOCILIZUMAB 20 MG/ML
400 INJECTION, SOLUTION, CONCENTRATE INTRAVENOUS ONCE
Refills: 0 | Status: COMPLETED | OUTPATIENT
Start: 2021-01-11 | End: 2021-01-11

## 2021-01-11 RX ADMIN — TOCILIZUMAB 100 MILLIGRAM(S): 20 INJECTION, SOLUTION, CONCENTRATE INTRAVENOUS at 19:49

## 2021-01-11 RX ADMIN — CHLORHEXIDINE GLUCONATE 1 APPLICATION(S): 213 SOLUTION TOPICAL at 05:33

## 2021-01-11 RX ADMIN — APIXABAN 5 MILLIGRAM(S): 2.5 TABLET, FILM COATED ORAL at 05:30

## 2021-01-11 RX ADMIN — ATORVASTATIN CALCIUM 40 MILLIGRAM(S): 80 TABLET, FILM COATED ORAL at 20:57

## 2021-01-11 RX ADMIN — PANTOPRAZOLE SODIUM 40 MILLIGRAM(S): 20 TABLET, DELAYED RELEASE ORAL at 05:34

## 2021-01-11 RX ADMIN — SODIUM ZIRCONIUM CYCLOSILICATE 5 GRAM(S): 10 POWDER, FOR SUSPENSION ORAL at 17:13

## 2021-01-11 RX ADMIN — ESCITALOPRAM OXALATE 10 MILLIGRAM(S): 10 TABLET, FILM COATED ORAL at 11:57

## 2021-01-11 RX ADMIN — APIXABAN 5 MILLIGRAM(S): 2.5 TABLET, FILM COATED ORAL at 17:13

## 2021-01-11 RX ADMIN — SODIUM ZIRCONIUM CYCLOSILICATE 5 GRAM(S): 10 POWDER, FOR SUSPENSION ORAL at 05:34

## 2021-01-11 RX ADMIN — Medication 6 MILLIGRAM(S): at 05:31

## 2021-01-11 RX ADMIN — BUPROPION HYDROCHLORIDE 300 MILLIGRAM(S): 150 TABLET, EXTENDED RELEASE ORAL at 11:57

## 2021-01-11 NOTE — PROGRESS NOTE ADULT - ATTENDING COMMENTS
Agree with resident's note, HPI, PE, assessment and plan.  Pt was seen and examined independently.     Pt states he is very discouraged that he is still on HFNC and not feeling significantly better. FiO2 decreased to 50%.    GENERAL: NAD  PULMONARY/CHEST: decreased breath sounds, basilar crackles, no changes from yesterday   CARDIOVASC: Regular rate and rhythm; No murmurs  GI/ABDOMEN: Soft, Nontender, Nondistended; Bowel sounds present  EXTREMITIES:  2+ Peripheral Pulses, No clubbing, cyanosis, or edema, no deformity. No calf tenderness b/l.  NERVOUS SYSTEM:  Alert & Oriented X3, no focal deficit     59y/o male with pmhx of PE on eliquis 5 years ago secondary to factor 5 leiden, HLD, depression, congenital solitary kidney, vasovagal syncope presented from [facility] for hypoxia.    # Acute hypoxic respiratory failure secondary to COVID severe viral pneumonia with superimposed chemical pneumonitis, less likely bacterial pneumonia  - patient is in late inflammatory phase , covid ab's- positive - no RDV  - patient currently on High flow NC, taper down as possible  - CXR 1/11: worsening b/l opacities  - IV decadron to complete 10 days course at least  - s/p Toci x1 on 1/8  - LE duplex negative   - maintain airborne/contact/droplet isolation  - ferritin 1362, CRP 0.76, D-dimer 590  - I doubt benefit of ABx    # Transaminitis (secondary to covid) downtrending    # Hyperkalemia - improved after Lokelma     # h/o Pulmonary embolus secondary to factor V leiden  - hx of factor V leiden according to patient though no records in [facility] --> pt follows up at [facility]  - cont eliquis 5mg BID     # h/o Dyslipidemia - c/w atorvastatin  # h/o Depression- cont welbutrin 300mg daily and lexapro 10mg daily  # h/o  Solitary kidney - Creatinine stable .     Full code.    Prognosis guarded Agree with resident's note, HPI, PE, assessment and plan.  Pt was seen and examined independently.     Pt states he is very discouraged that he is still on HFNC and not feeling significantly better. FiO2 decreased to 50%.    GENERAL: NAD  PULMONARY/CHEST: decreased breath sounds, basilar crackles, no changes from yesterday   CARDIOVASC: Regular rate and rhythm; No murmurs  GI/ABDOMEN: Soft, Nontender, Nondistended; Bowel sounds present  EXTREMITIES:  2+ Peripheral Pulses, No clubbing, cyanosis, or edema, no deformity. No calf tenderness b/l.  NERVOUS SYSTEM:  Alert & Oriented X3, no focal deficit     59y/o male with pmhx of PE on eliquis 5 years ago secondary to factor 5 leiden, HLD, depression, congenital solitary kidney, vasovagal syncope presented from [facility] for hypoxia.    # Acute hypoxic respiratory failure secondary to COVID severe viral pneumonia with superimposed chemical pneumonitis, less likely bacterial pneumonia  - patient is in late inflammatory phase , covid ab's- positive - no RDV  - patient currently on High flow NC, taper down as possible  - CXR 1/11: worsening b/l opacities  - IV decadron to complete 10 days course at least  - s/p Toci x1 on 1/8  - LE duplex negative   - maintain airborne/contact/droplet isolation  - ferritin 1362, D-dimer 590 increasing after Toci given  - will give another dose of Toci 400 mg IV, discussed with ID  - I doubt benefit of ABx    # Transaminitis (secondary to covid) downtrending    # Hyperkalemia - improved after Lokelma     # h/o Pulmonary embolus secondary to factor V leiden  - hx of factor V leiden according to patient though no records in [facility] --> pt follows up at [facility]  - cont eliquis 5mg BID     # h/o Dyslipidemia - c/w atorvastatin  # h/o Depression- cont welbutrin 300mg daily and lexapro 10mg daily  # h/o  Solitary kidney - Creatinine stable .     Full code.    Prognosis guarded

## 2021-01-11 NOTE — CHART NOTE - NSCHARTNOTEFT_GEN_A_CORE
I made rounds today with the treatment team including the hospitalist, residents,  nurses and  and discussed the patient's current medical status and discharge  planning needs, and reviewed the chart.    T(C): 37.3 (01-11-21 @ 12:15), Max: 37.3 (01-11-21 @ 12:15)  HR: 87 (01-11-21 @ 12:15) (79 - 87)  BP: 120/67 (01-11-21 @ 12:15) (102/60 - 120/67)  RR: 18 (01-11-21 @ 12:15) (18 - 18)  SpO2: 89% (01-11-21 @ 12:15) (89% - 97%)          I reached out to the patient's health care proxy/ responsible family member-           [     ]  I reached                                     and discussed the patient's medical condition,                   family concerns, and discharge planning           [     ]  I left a message with family               [  x   ]  I personally participated in rounds with the medical team and my resident and discussed the case. My resident reached                   family member/ HCP          Sheeba [phone]                      under my direction and supervision  and we reviewed the conversation.          [     ]  My resident left a message with family under my direction and supervision           [     ]   My resident attended medical rounds and called the family                [ x     ]    The following was discussed:  The patient's medical status over the past 24 hrs was reviewed, as well as oxygen needs and medication changes and labs. On HFNC O2, 90-93% sat. Received Toci. Plan to monitor and wean O2 as tolerated.         [    x  ]   The following concerns were raised: none          [     ] I spent 5-10 minutes on the above discussing medical issues with team members and family and/ or my resident    [ x    ] I spent 11-20 minutes on the above discussing medical issues with team members and family and/ or my resident    [     ] I spent 21-30 minutes on the above discussing medical issues with team members and family and/ or my resident
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
## 2021-01-12 DIAGNOSIS — Q60.0 RENAL AGENESIS, UNILATERAL: ICD-10-CM

## 2021-01-12 DIAGNOSIS — E11.65 TYPE 2 DIABETES MELLITUS WITH HYPERGLYCEMIA: ICD-10-CM
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
## 2021-01-12 DIAGNOSIS — U07.1 COVID-19: ICD-10-CM

## 2021-01-12 DIAGNOSIS — E78.5 HYPERLIPIDEMIA, UNSPECIFIED: ICD-10-CM

## 2021-01-12 DIAGNOSIS — E87.1 HYPO-OSMOLALITY AND HYPONATREMIA: ICD-10-CM

## 2021-01-12 DIAGNOSIS — J12.82 PNEUMONIA DUE TO CORONAVIRUS DISEASE 2019: ICD-10-CM

## 2021-01-12 DIAGNOSIS — E86.0 DEHYDRATION: ICD-10-CM

## 2021-01-12 DIAGNOSIS — R74.01 ELEVATION OF LEVELS OF LIVER TRANSAMINASE LEVELS: ICD-10-CM

## 2021-01-12 DIAGNOSIS — T17.998A OTHER FOREIGN OBJECT IN RESPIRATORY TRACT, PART UNSPECIFIED CAUSING OTHER INJURY, INITIAL ENCOUNTER: ICD-10-CM

## 2021-01-12 DIAGNOSIS — J96.01 ACUTE RESPIRATORY FAILURE WITH HYPOXIA: ICD-10-CM

## 2021-01-12 DIAGNOSIS — F32.9 MAJOR DEPRESSIVE DISORDER, SINGLE EPISODE, UNSPECIFIED: ICD-10-CM

## 2021-01-12 DIAGNOSIS — D68.51 ACTIVATED PROTEIN C RESISTANCE: ICD-10-CM

## 2021-01-12 DIAGNOSIS — D72.810 LYMPHOCYTOPENIA: ICD-10-CM

## 2021-01-12 DIAGNOSIS — Z86.711 PERSONAL HISTORY OF PULMONARY EMBOLISM: ICD-10-CM

## 2021-01-12 DIAGNOSIS — R63.8 OTHER SYMPTOMS AND SIGNS CONCERNING FOOD AND FLUID INTAKE: ICD-10-CM

## 2021-01-12 DIAGNOSIS — R11.10 VOMITING, UNSPECIFIED: ICD-10-CM

## 2021-01-12 DIAGNOSIS — Z66 DO NOT RESUSCITATE: ICD-10-CM

## 2021-01-12 DIAGNOSIS — Z79.01 LONG TERM (CURRENT) USE OF ANTICOAGULANTS: ICD-10-CM

## 2021-01-12 LAB
CULTURE RESULTS: SIGNIFICANT CHANGE UP
CULTURE RESULTS: SIGNIFICANT CHANGE UP
SPECIMEN SOURCE: SIGNIFICANT CHANGE UP
SPECIMEN SOURCE: SIGNIFICANT CHANGE UP

## 2021-01-12 PROCEDURE — 99233 SBSQ HOSP IP/OBS HIGH 50: CPT

## 2021-01-12 RX ADMIN — APIXABAN 5 MILLIGRAM(S): 2.5 TABLET, FILM COATED ORAL at 17:36

## 2021-01-12 RX ADMIN — PANTOPRAZOLE SODIUM 40 MILLIGRAM(S): 20 TABLET, DELAYED RELEASE ORAL at 05:31

## 2021-01-12 RX ADMIN — ATORVASTATIN CALCIUM 40 MILLIGRAM(S): 80 TABLET, FILM COATED ORAL at 21:54

## 2021-01-12 RX ADMIN — APIXABAN 5 MILLIGRAM(S): 2.5 TABLET, FILM COATED ORAL at 05:31

## 2021-01-12 RX ADMIN — BUPROPION HYDROCHLORIDE 300 MILLIGRAM(S): 150 TABLET, EXTENDED RELEASE ORAL at 11:24

## 2021-01-12 RX ADMIN — ESCITALOPRAM OXALATE 10 MILLIGRAM(S): 10 TABLET, FILM COATED ORAL at 11:24

## 2021-01-12 NOTE — PHYSICAL THERAPY INITIAL EVALUATION ADULT - THERAPY FREQUENCY, PT EVAL
Vaccine Information Statement(s) for was given today. This has been reviewed, questions answered, and verbal consent given by Parent for injection(s) and administration of Multi Vaccines between birth and 6 months.     3-5x/week

## 2021-01-12 NOTE — CHART NOTE - NSCHARTNOTEFT_GEN_A_CORE
I made rounds today with the treatment team including the hospitalist, residents,  nurses and  and discussed the patient's current medical status and discharge  planning needs, and reviewed the chart.    T(C): 37.1 (01-12-21 @ 12:35), Max: 37.1 (01-12-21 @ 12:35)  HR: 88 (01-12-21 @ 12:35) (80 - 90)  BP: 108/76 (01-12-21 @ 12:35) (108/76 - 117/57)  RR: 18 (01-12-21 @ 12:35) (18 - 18)  SpO2: 92% (01-12-21 @ 12:35) (91% - 95%)          I reached out to the patient's health care proxy/ responsible family member-           [     ]  I reached                                     and discussed the patient's medical condition,                   family concerns, and discharge planning           [     ]  I left a message with family               [   x  ]  I personally participated in rounds with the medical team and my resident and discussed the case. My resident reached                   family member/ HCP       Sheeba [phone]                         under my direction and supervision  and we reviewed the conversation.          [     ]  My resident left a message with family under my direction and supervision           [     ]   My resident attended medical rounds and called the family                [    x  ]    The following was discussed:  The patient's medical status over the past 24 hrs was reviewed, as well as oxygen needs and medication changes and labs. On HFNC O2. Unchanged clinically. No fever. Received 2nd Toci dose. Plan to f/u chest x-ray and inflammatory markers.          [ x     ]   The following concerns were raised: none          [     ] I spent 5-10 minutes on the above discussing medical issues with team members and family and/ or my resident    [   x  ] I spent 11-20 minutes on the above discussing medical issues with team members and family and/ or my resident    [     ] I spent 21-30 minutes on the above discussing medical issues with team members and family and/ or my resident

## 2021-01-12 NOTE — PHYSICAL THERAPY INITIAL EVALUATION ADULT - PERTINENT HX OF CURRENT PROBLEM, REHAB EVAL
`59yo male with pmhx of PE on eliquis 5 years ago secondary to factor 5 leiden, HLD, depression, congenital solitary kidney, vasovagal syncope presented from [facility] for hypoxia.

## 2021-01-12 NOTE — PROGRESS NOTE ADULT - ASSESSMENT
ASSESSMENT  61yo male with pmhx of Factor V Leiden complicated with PE on Eliquis, HLD, depression, congenital solitary kidney, vasovagal syncope presented from [facility] for hypoxia    IMPRESSION  #COVID-19 infection  - Severe, likely in late inflammatory phase  - D-Dimer Assay, Quantitative: 250:  (01.07.21 @ 00:25) -> 590  - C-Reactive Protein, Serum: 5.31 mg/dL (01.03.21 @ 21:09) -> 0.76   - Ferritin, Serum: 1290 ng/mL (01.03.21 @ 21:09) -> 1362  - Procalcitonin, Serum: 0.18  (01.03.21 @ 21:09) -> 0.05  - s/p Tocilizumab 1/8 and 1/11    #Factor V Leiden  #Congenital Solitary Kidney    RECOMMENDATIONS  - Dexamethasone 6mg daily   - A/C per primary team - on Eliquis  - Prone positioning if possible    Please call or message on [employer] Teams if with any questions.  Spectra [phone]

## 2021-01-12 NOTE — PHYSICAL THERAPY INITIAL EVALUATION ADULT - GENERAL OBSERVATIONS, REHAB EVAL
Pt was seen from 13:30-13:55 for PT IE. Pt was rec'd in semi recline in bed, +HFNC, agreeable to participate in PT.

## 2021-01-12 NOTE — PROGRESS NOTE ADULT - ATTENDING COMMENTS
Agree with resident's note, HPI, PE, assessment and plan.  Pt was seen and examined independently.     Pt feels the same, still on HFNC 60%/60. no new complaints.    GENERAL: NAD  PULMONARY/CHEST: decreased breath sounds, b/l basilar crackles, no changes from yesterday   CARDIOVASC: Regular rate and rhythm; No murmurs  GI/ABDOMEN: Soft, Nontender, Nondistended; Bowel sounds present  EXTREMITIES:  2+ Peripheral Pulses, No clubbing, cyanosis, or edema, no deformity. No calf tenderness b/l.  NERVOUS SYSTEM:  Alert & Oriented X3, no focal deficit     61y/o male with pmhx of PE on eliquis 5 years ago secondary to factor 5 leiden, HLD, depression, congenital solitary kidney, vasovagal syncope presented from [facility] for hypoxia.    # Acute hypoxic respiratory failure secondary to COVID severe viral pneumonia with superimposed chemical pneumonitis, less likely bacterial pneumonia  - patient is in late inflammatory phase, covid ab's- positive - no RDV  - patient currently on High flow NC, taper down as possible  - CXR 1/11: worsening b/l opacities  - IV decadron to complete 10 days course at least  - s/p Toci x1 on 1/8 and 1/11  - LE duplex negative   - maintain airborne/contact/droplet isolation  - ferritin 1362, D-dimer 590 increasing after fisrt Toci given  - I doubt benefit of ABx  - repeat CXR and inflammatory markers tomorrow  - prone position, OOBTC, PT    # Transaminitis (secondary to covid) downtrending    # Hyperkalemia - improved after Lokelma     # h/o Pulmonary embolus secondary to factor V leiden  - hx of factor V leiden according to patient though no records in [facility] --> pt follows up at [facility]  - cont eliquis 5mg BID     # h/o Dyslipidemia - c/w atorvastatin  # h/o Depression- cont welbutrin 300mg daily and lexapro 10mg daily  # h/o  Solitary kidney - Creatinine stable .     Full code.    Prognosis guarded.

## 2021-01-13 LAB
ALBUMIN SERPL ELPH-MCNC: 3.2 G/DL — LOW (ref 3.5–5.2)
ALP SERPL-CCNC: 53 U/L — SIGNIFICANT CHANGE UP (ref 30–115)
ALT FLD-CCNC: 45 U/L — HIGH (ref 0–41)
ANION GAP SERPL CALC-SCNC: 11 MMOL/L — SIGNIFICANT CHANGE UP (ref 7–14)
AST SERPL-CCNC: 33 U/L — SIGNIFICANT CHANGE UP (ref 0–41)
BASOPHILS # BLD AUTO: 0.03 K/UL — SIGNIFICANT CHANGE UP (ref 0–0.2)
BASOPHILS NFR BLD AUTO: 0.3 % — SIGNIFICANT CHANGE UP (ref 0–1)
BILIRUB SERPL-MCNC: 1.1 MG/DL — SIGNIFICANT CHANGE UP (ref 0.2–1.2)
BUN SERPL-MCNC: 29 MG/DL — HIGH (ref 10–20)
CALCIUM SERPL-MCNC: 8 MG/DL — LOW (ref 8.5–10.1)
CHLORIDE SERPL-SCNC: 105 MMOL/L — SIGNIFICANT CHANGE UP (ref 98–110)
CO2 SERPL-SCNC: 26 MMOL/L — SIGNIFICANT CHANGE UP (ref 17–32)
CREAT SERPL-MCNC: 1.1 MG/DL — SIGNIFICANT CHANGE UP (ref 0.7–1.5)
CRP SERPL-MCNC: 0.1 MG/DL — SIGNIFICANT CHANGE UP (ref 0–0.4)
D DIMER BLD IA.RAPID-MCNC: 931 NG/ML DDU — HIGH (ref 0–230)
EOSINOPHIL # BLD AUTO: 0.3 K/UL — SIGNIFICANT CHANGE UP (ref 0–0.7)
EOSINOPHIL NFR BLD AUTO: 2.6 % — SIGNIFICANT CHANGE UP (ref 0–8)
GLUCOSE SERPL-MCNC: 119 MG/DL — HIGH (ref 70–99)
HCT VFR BLD CALC: 47.7 % — SIGNIFICANT CHANGE UP (ref 42–52)
HGB BLD-MCNC: 16.3 G/DL — SIGNIFICANT CHANGE UP (ref 14–18)
IMM GRANULOCYTES NFR BLD AUTO: 3.5 % — HIGH (ref 0.1–0.3)
LDH SERPL L TO P-CCNC: 413 U/L — HIGH (ref 50–242)
LYMPHOCYTES # BLD AUTO: 1.47 K/UL — SIGNIFICANT CHANGE UP (ref 1.2–3.4)
LYMPHOCYTES # BLD AUTO: 12.8 % — LOW (ref 20.5–51.1)
MAGNESIUM SERPL-MCNC: 2.3 MG/DL — SIGNIFICANT CHANGE UP (ref 1.8–2.4)
MCHC RBC-ENTMCNC: 31.3 PG — HIGH (ref 27–31)
MCHC RBC-ENTMCNC: 34.2 G/DL — SIGNIFICANT CHANGE UP (ref 32–37)
MCV RBC AUTO: 91.7 FL — SIGNIFICANT CHANGE UP (ref 80–94)
MONOCYTES # BLD AUTO: 0.99 K/UL — HIGH (ref 0.1–0.6)
MONOCYTES NFR BLD AUTO: 8.6 % — SIGNIFICANT CHANGE UP (ref 1.7–9.3)
NEUTROPHILS # BLD AUTO: 8.33 K/UL — HIGH (ref 1.4–6.5)
NEUTROPHILS NFR BLD AUTO: 72.2 % — SIGNIFICANT CHANGE UP (ref 42.2–75.2)
NRBC # BLD: 0 /100 WBCS — SIGNIFICANT CHANGE UP (ref 0–0)
PLATELET # BLD AUTO: 238 K/UL — SIGNIFICANT CHANGE UP (ref 130–400)
POTASSIUM SERPL-MCNC: 4.5 MMOL/L — SIGNIFICANT CHANGE UP (ref 3.5–5)
POTASSIUM SERPL-SCNC: 4.5 MMOL/L — SIGNIFICANT CHANGE UP (ref 3.5–5)
PROCALCITONIN SERPL-MCNC: 0.04 NG/ML — SIGNIFICANT CHANGE UP (ref 0.02–0.1)
PROT SERPL-MCNC: 5.9 G/DL — LOW (ref 6–8)
RBC # BLD: 5.2 M/UL — SIGNIFICANT CHANGE UP (ref 4.7–6.1)
RBC # FLD: 11.9 % — SIGNIFICANT CHANGE UP (ref 11.5–14.5)
SODIUM SERPL-SCNC: 142 MMOL/L — SIGNIFICANT CHANGE UP (ref 135–146)
WBC # BLD: 11.52 K/UL — HIGH (ref 4.8–10.8)
WBC # FLD AUTO: 11.52 K/UL — HIGH (ref 4.8–10.8)

## 2021-01-13 PROCEDURE — 71045 X-RAY EXAM CHEST 1 VIEW: CPT | Mod: 26

## 2021-01-13 PROCEDURE — 99233 SBSQ HOSP IP/OBS HIGH 50: CPT

## 2021-01-13 RX ORDER — DEXAMETHASONE 0.5 MG/5ML
6 ELIXIR ORAL DAILY
Refills: 0 | Status: COMPLETED | OUTPATIENT
Start: 2021-01-13 | End: 2021-01-18

## 2021-01-13 RX ORDER — SODIUM CHLORIDE 9 MG/ML
500 INJECTION INTRAMUSCULAR; INTRAVENOUS; SUBCUTANEOUS ONCE
Refills: 0 | Status: COMPLETED | OUTPATIENT
Start: 2021-01-13 | End: 2021-01-13

## 2021-01-13 RX ADMIN — Medication 6 MILLIGRAM(S): at 09:27

## 2021-01-13 RX ADMIN — ATORVASTATIN CALCIUM 40 MILLIGRAM(S): 80 TABLET, FILM COATED ORAL at 21:27

## 2021-01-13 RX ADMIN — ESCITALOPRAM OXALATE 10 MILLIGRAM(S): 10 TABLET, FILM COATED ORAL at 10:33

## 2021-01-13 RX ADMIN — BUPROPION HYDROCHLORIDE 300 MILLIGRAM(S): 150 TABLET, EXTENDED RELEASE ORAL at 10:33

## 2021-01-13 RX ADMIN — SODIUM CHLORIDE 1000 MILLILITER(S): 9 INJECTION INTRAMUSCULAR; INTRAVENOUS; SUBCUTANEOUS at 15:28

## 2021-01-13 RX ADMIN — APIXABAN 5 MILLIGRAM(S): 2.5 TABLET, FILM COATED ORAL at 16:03

## 2021-01-13 RX ADMIN — PANTOPRAZOLE SODIUM 40 MILLIGRAM(S): 20 TABLET, DELAYED RELEASE ORAL at 05:13

## 2021-01-13 RX ADMIN — APIXABAN 5 MILLIGRAM(S): 2.5 TABLET, FILM COATED ORAL at 05:14

## 2021-01-13 NOTE — CHART NOTE - NSCHARTNOTEFT_GEN_A_CORE
I made rounds today with the treatment team including the hospitalist, residents,  nurses and  and discussed the patient's current medical status and discharge  planning needs, and reviewed the chart.    T(C): 35.8 (01-13-21 @ 13:00), Max: 36.2 (01-12-21 @ 20:25)  HR: 89 (01-13-21 @ 13:00) (77 - 92)  BP: 97/59 (01-13-21 @ 13:00) (96/66 - 107/71)  RR: 18 (01-13-21 @ 13:00) (18 - 18)  SpO2: 98% (01-13-21 @ 13:00) (95% - 98%)          I reached out to the patient's health care proxy/ responsible family member-           [     ]  I reached                                     and discussed the patient's medical condition,                   family concerns, and discharge planning           [     ]  I left a message with family               [    x ]  I personally participated in rounds with the medical team and my resident and discussed the case. My resident reached                   family member/ HCP        Sheeba                        under my direction and supervision  and we reviewed the conversation.          [     ]  My resident left a message with family under my direction and supervision           [     ]   My resident attended medical rounds and called the family                [ x     ]    The following was discussed:  The patient's medical status over the past 24 hrs was reviewed, as well as oxygen needs and medication changes and labs. On HFNC O2. Duplex Doppler neg. Walked with PT few steps.         [   x   ]   The following concerns were raised: wants to know results of repeat chest x-ray. Relayed to resident          [     ] I spent 5-10 minutes on the above discussing medical issues with team members and family and/ or my resident    [ x    ] I spent 11-20 minutes on the above discussing medical issues with team members and family and/ or my resident    [     ] I spent 21-30 minutes on the above discussing medical issues with team members and family and/ or my resident

## 2021-01-13 NOTE — PROGRESS NOTE ADULT - ATTENDING COMMENTS
61y/o male with pmhx of PE on eliquis 5 years ago secondary to factor 5 leiden, HLD, depression, congenital solitary kidney, vasovagal syncope presented from [facility] for hypoxia.    # Acute hypoxic respiratory failure secondary to COVID severe viral pneumonia with superimposed chemical pneumonitis, less likely bacterial pneumonia  - patient is in late inflammatory phase, covid ab's- positive - no RDV  - patient currently on High flow NC, taper down as possible  - CXR 1/11: worsening b/l opacities  - IV decadron to complete 10 days course at least  - s/p Toci x1 on 1/8 and 1/11  - LE duplex negative   - ferritin 1362, D-dimer 590 increasing after fisrt Toci given  - prone position, OOBTC, PT    1/13: HFNC 60/60.    # Transaminitis (secondary to covid) downtrending    # Hyperkalemia - improved after Lokelma     # h/o Pulmonary embolus secondary to factor V leiden  - hx of factor V leiden according to patient though no records in [facility] --> pt follows up at [facility]  - cont eliquis 5mg BID     # h/o Dyslipidemia - c/w atorvastatin  # h/o Depression- cont welbutrin 300mg daily and lexapro 10mg daily  # h/o  Solitary kidney - Creatinine stable .     Full code.

## 2021-01-14 LAB
ANION GAP SERPL CALC-SCNC: 10 MMOL/L — SIGNIFICANT CHANGE UP (ref 7–14)
BASOPHILS # BLD AUTO: 0.05 K/UL — SIGNIFICANT CHANGE UP (ref 0–0.2)
BASOPHILS NFR BLD AUTO: 0.3 % — SIGNIFICANT CHANGE UP (ref 0–1)
BUN SERPL-MCNC: 23 MG/DL — HIGH (ref 10–20)
CALCIUM SERPL-MCNC: 7.9 MG/DL — LOW (ref 8.5–10.1)
CHLORIDE SERPL-SCNC: 107 MMOL/L — SIGNIFICANT CHANGE UP (ref 98–110)
CO2 SERPL-SCNC: 22 MMOL/L — SIGNIFICANT CHANGE UP (ref 17–32)
CREAT SERPL-MCNC: 0.9 MG/DL — SIGNIFICANT CHANGE UP (ref 0.7–1.5)
EOSINOPHIL # BLD AUTO: 0.05 K/UL — SIGNIFICANT CHANGE UP (ref 0–0.7)
EOSINOPHIL NFR BLD AUTO: 0.3 % — SIGNIFICANT CHANGE UP (ref 0–8)
FERRITIN SERPL-MCNC: 1274 NG/ML — HIGH (ref 30–400)
GLUCOSE SERPL-MCNC: 162 MG/DL — HIGH (ref 70–99)
HCT VFR BLD CALC: 44.5 % — SIGNIFICANT CHANGE UP (ref 42–52)
HGB BLD-MCNC: 14.7 G/DL — SIGNIFICANT CHANGE UP (ref 14–18)
IMM GRANULOCYTES NFR BLD AUTO: 1.9 % — HIGH (ref 0.1–0.3)
LYMPHOCYTES # BLD AUTO: 1.28 K/UL — SIGNIFICANT CHANGE UP (ref 1.2–3.4)
LYMPHOCYTES # BLD AUTO: 8.8 % — LOW (ref 20.5–51.1)
MAGNESIUM SERPL-MCNC: 2.3 MG/DL — SIGNIFICANT CHANGE UP (ref 1.8–2.4)
MCHC RBC-ENTMCNC: 30.4 PG — SIGNIFICANT CHANGE UP (ref 27–31)
MCHC RBC-ENTMCNC: 33 G/DL — SIGNIFICANT CHANGE UP (ref 32–37)
MCV RBC AUTO: 92.1 FL — SIGNIFICANT CHANGE UP (ref 80–94)
MONOCYTES # BLD AUTO: 0.61 K/UL — HIGH (ref 0.1–0.6)
MONOCYTES NFR BLD AUTO: 4.2 % — SIGNIFICANT CHANGE UP (ref 1.7–9.3)
NEUTROPHILS # BLD AUTO: 12.24 K/UL — HIGH (ref 1.4–6.5)
NEUTROPHILS NFR BLD AUTO: 84.5 % — HIGH (ref 42.2–75.2)
NRBC # BLD: 0 /100 WBCS — SIGNIFICANT CHANGE UP (ref 0–0)
PLATELET # BLD AUTO: 211 K/UL — SIGNIFICANT CHANGE UP (ref 130–400)
POTASSIUM SERPL-MCNC: 4.1 MMOL/L — SIGNIFICANT CHANGE UP (ref 3.5–5)
POTASSIUM SERPL-SCNC: 4.1 MMOL/L — SIGNIFICANT CHANGE UP (ref 3.5–5)
RBC # BLD: 4.83 M/UL — SIGNIFICANT CHANGE UP (ref 4.7–6.1)
RBC # FLD: 11.9 % — SIGNIFICANT CHANGE UP (ref 11.5–14.5)
SODIUM SERPL-SCNC: 139 MMOL/L — SIGNIFICANT CHANGE UP (ref 135–146)
WBC # BLD: 14.5 K/UL — HIGH (ref 4.8–10.8)
WBC # FLD AUTO: 14.5 K/UL — HIGH (ref 4.8–10.8)

## 2021-01-14 PROCEDURE — 99233 SBSQ HOSP IP/OBS HIGH 50: CPT

## 2021-01-14 RX ADMIN — CHLORHEXIDINE GLUCONATE 1 APPLICATION(S): 213 SOLUTION TOPICAL at 05:24

## 2021-01-14 RX ADMIN — APIXABAN 5 MILLIGRAM(S): 2.5 TABLET, FILM COATED ORAL at 05:24

## 2021-01-14 RX ADMIN — Medication 6 MILLIGRAM(S): at 05:24

## 2021-01-14 RX ADMIN — APIXABAN 5 MILLIGRAM(S): 2.5 TABLET, FILM COATED ORAL at 17:11

## 2021-01-14 RX ADMIN — Medication 1 TABLET(S): at 10:39

## 2021-01-14 RX ADMIN — ATORVASTATIN CALCIUM 40 MILLIGRAM(S): 80 TABLET, FILM COATED ORAL at 21:01

## 2021-01-14 RX ADMIN — BUPROPION HYDROCHLORIDE 300 MILLIGRAM(S): 150 TABLET, EXTENDED RELEASE ORAL at 10:39

## 2021-01-14 RX ADMIN — ESCITALOPRAM OXALATE 10 MILLIGRAM(S): 10 TABLET, FILM COATED ORAL at 10:39

## 2021-01-14 RX ADMIN — PANTOPRAZOLE SODIUM 40 MILLIGRAM(S): 20 TABLET, DELAYED RELEASE ORAL at 05:24

## 2021-01-14 NOTE — DIETITIAN INITIAL EVALUATION ADULT. - OTHER INFO
Acute hypoxemic respiratory failure likely secondary to COVID PNA. Transaminitis likely secondary to covid. Pulmonary embolus secondary to factor V leiden. Depression. Full code

## 2021-01-14 NOTE — DIETITIAN INITIAL EVALUATION ADULT. - PERSON TAUGHT/METHOD
discussed vitamin supplements, prediabetes, and importance of adequate nutrition/verbal instruction/spouse instructed

## 2021-01-14 NOTE — CHART NOTE - NSCHARTNOTEFT_GEN_A_CORE
I made rounds today with the treatment team including the hospitalist, residents,  nurses and  and discussed the patient's current medical status and discharge  planning needs, and reviewed the chart.    T(C): 36.2 (01-14-21 @ 12:50), Max: 36.6 (01-13-21 @ 20:33)  HR: 92 (01-14-21 @ 12:50) (85 - 99)  BP: 109/67 (01-14-21 @ 12:50) (108/58 - 114/71)  RR: 18 (01-14-21 @ 12:50) (18 - 18)  SpO2: 97% (01-14-21 @ 12:50) (94% - 97%)          I reached out to the patient's health care proxy/ responsible family member-           [     ]  I reached                                     and discussed the patient's medical condition,                   family concerns, and discharge planning           [     ]  I left a message with family               [  x   ]  I personally participated in rounds with the medical team and my resident and discussed the case. My resident reached                   family member/ HCP      Sheeba,  [phone]                          under my direction and supervision  and we reviewed the conversation.          [     ]  My resident left a message with family under my direction and supervision           [     ]   My resident attended medical rounds and called the family                [   x   ]    The following was discussed:  The patient's medical status over the past 24 hrs was reviewed, as well as oxygen needs and medication changes and labs. On HFNC O2. Weaning slowly. Repeat inflammatory markers pending. Chest x-ray unchanged. Feeling better. VSS         [  x    ]   The following concerns were raised: none          [     ] I spent 5-10 minutes on the above discussing medical issues with team members and family and/ or my resident    [   x  ] I spent 11-20 minutes on the above discussing medical issues with team members and family and/ or my resident    [     ] I spent 21-30 minutes on the above discussing medical issues with team members and family and/ or my resident

## 2021-01-14 NOTE — PROGRESS NOTE ADULT - ATTENDING COMMENTS
61y/o male with pmhx of PE on eliquis 5 years ago secondary to factor 5 leiden, HLD, depression, congenital solitary kidney, vasovagal syncope presented from [facility] for hypoxia.    # Acute hypoxic respiratory failure secondary to COVID severe viral pneumonia with superimposed chemical pneumonitis, less likely bacterial pneumonia  - patient is in late inflammatory phase, covid ab's- positive - no RDV  - CXR 1/11: worsening b/l opacities  - LE duplex negative   - ferritin 1362 > 1274 (1/13)   D-dimer 590 > 931 (1/13)    - IV decadron to complete 10 days course at least  - s/p Toci x1 on 1/8 and 1/11  - prone position, OOBTC, PT    1/13: HFNC 60/60.  1/14: HFNC 60/50. Walking short distances comfortably.    # Transaminitis (secondary to covid) downtrending    # Hyperkalemia - improved after Lokelma     # h/o Pulmonary embolus secondary to factor V leiden  - hx of factor V leiden according to patient though no records in [facility] --> pt follows up at [facility]  - cont eliquis 5mg BID     # h/o Dyslipidemia - c/w atorvastatin  # h/o Depression- cont welbutrin 300mg daily and lexapro 10mg daily  # h/o  Solitary kidney - Creatinine stable .     Full code.

## 2021-01-14 NOTE — DIETITIAN INITIAL EVALUATION ADULT. - OTHER CALCULATIONS
using stated weight 81.8 kg; Calories: 1959 - 2161 kcal (MSJ x 1.2 - 1.3 AF) Protein: 81 - 98 gms (1.0 - 1.2 gm/kg) Fluid: 1ml/kcal or per LIP

## 2021-01-14 NOTE — DIETITIAN INITIAL EVALUATION ADULT. - ORAL INTAKE PTA/DIET HISTORY
Attempted to call pt's phone but no answer. Spoke to pt's wife Sheeba. She reports poor po intake 3 days PTA r/t COVID symptoms. She says pt is eating well now per RN. Po intake 50-80%. Says pt is enjoying the food and trying to eat more to get more strength. NKFA. No chewing/swallowing issues. Takes vitamin C, D, B at home. She inquired about vitamins to help w/ inflammation/immunity. Asked MD to start MVI yesterday.

## 2021-01-14 NOTE — DIETITIAN INITIAL EVALUATION ADULT. - PERTINENT MEDS FT
MEDICATIONS  (STANDING):  apixaban 5 milliGRAM(s) Oral every 12 hours  atorvastatin Oral Tab/Cap - Peds 40 milliGRAM(s) Oral daily  dexAMETHasone  Injectable 6 milliGRAM(s) IV Push daily  escitalopram 10 milliGRAM(s) Oral daily  multivitamin 1 Tablet(s) Oral daily  pantoprazole    Tablet 40 milliGRAM(s) Oral before breakfast

## 2021-01-14 NOTE — DIETITIAN INITIAL EVALUATION ADULT. - PHYSCIAL ASSESSMENT
skin intact. no edema. Pt's wife reports pt likely not 200#. She suggests 180#. although pt likely lost weight from poor appetite PTA/overweight

## 2021-01-15 LAB
ALBUMIN SERPL ELPH-MCNC: 3.8 G/DL — SIGNIFICANT CHANGE UP (ref 3.5–5.2)
ALP SERPL-CCNC: 44 U/L — SIGNIFICANT CHANGE UP (ref 30–115)
ALT FLD-CCNC: 61 U/L — HIGH (ref 0–41)
ANION GAP SERPL CALC-SCNC: 10 MMOL/L — SIGNIFICANT CHANGE UP (ref 7–14)
AST SERPL-CCNC: 36 U/L — SIGNIFICANT CHANGE UP (ref 0–41)
BASOPHILS # BLD AUTO: 0.03 K/UL — SIGNIFICANT CHANGE UP (ref 0–0.2)
BASOPHILS NFR BLD AUTO: 0.2 % — SIGNIFICANT CHANGE UP (ref 0–1)
BILIRUB SERPL-MCNC: 1.1 MG/DL — SIGNIFICANT CHANGE UP (ref 0.2–1.2)
BUN SERPL-MCNC: 24 MG/DL — HIGH (ref 10–20)
CALCIUM SERPL-MCNC: 8.1 MG/DL — LOW (ref 8.5–10.1)
CHLORIDE SERPL-SCNC: 107 MMOL/L — SIGNIFICANT CHANGE UP (ref 98–110)
CO2 SERPL-SCNC: 24 MMOL/L — SIGNIFICANT CHANGE UP (ref 17–32)
CREAT SERPL-MCNC: 0.9 MG/DL — SIGNIFICANT CHANGE UP (ref 0.7–1.5)
EOSINOPHIL # BLD AUTO: 0.04 K/UL — SIGNIFICANT CHANGE UP (ref 0–0.7)
EOSINOPHIL NFR BLD AUTO: 0.3 % — SIGNIFICANT CHANGE UP (ref 0–8)
GLUCOSE SERPL-MCNC: 136 MG/DL — HIGH (ref 70–99)
HCT VFR BLD CALC: 44.3 % — SIGNIFICANT CHANGE UP (ref 42–52)
HGB BLD-MCNC: 15 G/DL — SIGNIFICANT CHANGE UP (ref 14–18)
IMM GRANULOCYTES NFR BLD AUTO: 1.3 % — HIGH (ref 0.1–0.3)
LYMPHOCYTES # BLD AUTO: 1.32 K/UL — SIGNIFICANT CHANGE UP (ref 1.2–3.4)
LYMPHOCYTES # BLD AUTO: 9.4 % — LOW (ref 20.5–51.1)
MAGNESIUM SERPL-MCNC: 2.2 MG/DL — SIGNIFICANT CHANGE UP (ref 1.8–2.4)
MCHC RBC-ENTMCNC: 31.1 PG — HIGH (ref 27–31)
MCHC RBC-ENTMCNC: 33.9 G/DL — SIGNIFICANT CHANGE UP (ref 32–37)
MCV RBC AUTO: 91.7 FL — SIGNIFICANT CHANGE UP (ref 80–94)
MONOCYTES # BLD AUTO: 0.7 K/UL — HIGH (ref 0.1–0.6)
MONOCYTES NFR BLD AUTO: 5 % — SIGNIFICANT CHANGE UP (ref 1.7–9.3)
NEUTROPHILS # BLD AUTO: 11.83 K/UL — HIGH (ref 1.4–6.5)
NEUTROPHILS NFR BLD AUTO: 83.8 % — HIGH (ref 42.2–75.2)
NRBC # BLD: 0 /100 WBCS — SIGNIFICANT CHANGE UP (ref 0–0)
PLATELET # BLD AUTO: 192 K/UL — SIGNIFICANT CHANGE UP (ref 130–400)
POTASSIUM SERPL-MCNC: 4.3 MMOL/L — SIGNIFICANT CHANGE UP (ref 3.5–5)
POTASSIUM SERPL-SCNC: 4.3 MMOL/L — SIGNIFICANT CHANGE UP (ref 3.5–5)
PROT SERPL-MCNC: 5.8 G/DL — LOW (ref 6–8)
RBC # BLD: 4.83 M/UL — SIGNIFICANT CHANGE UP (ref 4.7–6.1)
RBC # FLD: 12 % — SIGNIFICANT CHANGE UP (ref 11.5–14.5)
SODIUM SERPL-SCNC: 141 MMOL/L — SIGNIFICANT CHANGE UP (ref 135–146)
WBC # BLD: 14.1 K/UL — HIGH (ref 4.8–10.8)
WBC # FLD AUTO: 14.1 K/UL — HIGH (ref 4.8–10.8)

## 2021-01-15 PROCEDURE — 99233 SBSQ HOSP IP/OBS HIGH 50: CPT

## 2021-01-15 RX ADMIN — ATORVASTATIN CALCIUM 40 MILLIGRAM(S): 80 TABLET, FILM COATED ORAL at 21:29

## 2021-01-15 RX ADMIN — Medication 6 MILLIGRAM(S): at 05:13

## 2021-01-15 RX ADMIN — APIXABAN 5 MILLIGRAM(S): 2.5 TABLET, FILM COATED ORAL at 17:39

## 2021-01-15 RX ADMIN — PANTOPRAZOLE SODIUM 40 MILLIGRAM(S): 20 TABLET, DELAYED RELEASE ORAL at 05:13

## 2021-01-15 RX ADMIN — BUPROPION HYDROCHLORIDE 300 MILLIGRAM(S): 150 TABLET, EXTENDED RELEASE ORAL at 11:43

## 2021-01-15 RX ADMIN — ESCITALOPRAM OXALATE 10 MILLIGRAM(S): 10 TABLET, FILM COATED ORAL at 11:43

## 2021-01-15 RX ADMIN — APIXABAN 5 MILLIGRAM(S): 2.5 TABLET, FILM COATED ORAL at 05:13

## 2021-01-15 RX ADMIN — Medication 1 TABLET(S): at 11:42

## 2021-01-15 NOTE — PROGRESS NOTE ADULT - ATTENDING COMMENTS
59y/o male with pmhx of PE on eliquis 5 years ago secondary to factor 5 leiden, HLD, depression, congenital solitary kidney, vasovagal syncope presented from [facility] for hypoxia.    # Acute hypoxic respiratory failure secondary to COVID severe viral pneumonia with superimposed chemical pneumonitis, less likely bacterial pneumonia  - patient is in late inflammatory phase, covid ab's- positive - no RDV  - CXR 1/11: worsening b/l opacities  - LE duplex negative   - ferritin 1362 > 1274 (1/13)   D-dimer 590 > 931 (1/13)    - IV decadron to complete 10 days course at least  - s/p Toci x1 on 1/8 and 1/11  - prone position, OOBTC, PT    1/13: HFNC 60/60.  1/14: HFNC 60/50. Walking short distances comfortably.  1/15: HFNC 50/50. Walking short distances comfortably.    # Transaminitis (secondary to covid) downtrending    # Hyperkalemia - improved after Lokelma     # h/o Pulmonary embolus secondary to factor V leiden  - hx of factor V leiden according to patient though no records in [facility] --> pt follows up at [facility]  - cont eliquis 5mg BID     # h/o Dyslipidemia - c/w atorvastatin  # h/o Depression- cont welbutrin 300mg daily and lexapro 10mg daily  # h/o  Solitary kidney - Creatinine stable .     Full code.

## 2021-01-15 NOTE — CHART NOTE - NSCHARTNOTEFT_GEN_A_CORE
I made rounds today with the treatment team including the hospitalist, residents,  nurses and  and discussed the patient's current medical status and discharge  planning needs, and reviewed the chart.    T(C): 36.4 (01-15-21 @ 05:00), Max: 36.4 (01-15-21 @ 05:00)  HR: 68 (01-15-21 @ 05:00) (68 - 98)  BP: 111/73 (01-15-21 @ 05:00) (110/73 - 111/73)  RR: 18 (01-15-21 @ 05:00) (18 - 18)  SpO2: 95% (01-15-21 @ 07:55) (94% - 97%)          I reached out to the patient's health care proxy/ responsible family member-           [     ]  I reached                                     and discussed the patient's medical condition,                   family concerns, and discharge planning           [     ]  I left a message with family               [  x   ]  I personally participated in rounds with the medical team and my resident and discussed the case. My resident reached                   family member/ HCP       Sheeba- [phone]                         under my direction and supervision  and we reviewed the conversation.          [     ]  My resident left a message with family under my direction and supervision           [     ]   My resident attended medical rounds and called the family                [   x   ]    The following was discussed:  The patient's medical status over the past 24 hrs was reviewed, as well as oxygen needs and medication changes and labs. HFNC O2 decreased to 50/50. Comfortable, eating well. Plan to wean O2 as tolerated.         [  x    ]   The following concerns were raised: none          [     ] I spent 5-10 minutes on the above discussing medical issues with team members and family and/ or my resident    [   x  ] I spent 11-20 minutes on the above discussing medical issues with team members and family and/ or my resident    [     ] I spent 21-30 minutes on the above discussing medical issues with team members and family and/ or my resident

## 2021-01-16 LAB
ALBUMIN SERPL ELPH-MCNC: 3.4 G/DL — LOW (ref 3.5–5.2)
ALP SERPL-CCNC: 52 U/L — SIGNIFICANT CHANGE UP (ref 30–115)
ALT FLD-CCNC: 65 U/L — HIGH (ref 0–41)
ANION GAP SERPL CALC-SCNC: 10 MMOL/L — SIGNIFICANT CHANGE UP (ref 7–14)
AST SERPL-CCNC: 32 U/L — SIGNIFICANT CHANGE UP (ref 0–41)
BASOPHILS # BLD AUTO: 0.03 K/UL — SIGNIFICANT CHANGE UP (ref 0–0.2)
BASOPHILS NFR BLD AUTO: 0.2 % — SIGNIFICANT CHANGE UP (ref 0–1)
BILIRUB SERPL-MCNC: 0.5 MG/DL — SIGNIFICANT CHANGE UP (ref 0.2–1.2)
BUN SERPL-MCNC: 22 MG/DL — HIGH (ref 10–20)
CALCIUM SERPL-MCNC: 8.1 MG/DL — LOW (ref 8.5–10.1)
CHLORIDE SERPL-SCNC: 106 MMOL/L — SIGNIFICANT CHANGE UP (ref 98–110)
CO2 SERPL-SCNC: 22 MMOL/L — SIGNIFICANT CHANGE UP (ref 17–32)
CREAT SERPL-MCNC: 0.9 MG/DL — SIGNIFICANT CHANGE UP (ref 0.7–1.5)
EOSINOPHIL # BLD AUTO: 0 K/UL — SIGNIFICANT CHANGE UP (ref 0–0.7)
EOSINOPHIL NFR BLD AUTO: 0 % — SIGNIFICANT CHANGE UP (ref 0–8)
GLUCOSE SERPL-MCNC: 294 MG/DL — HIGH (ref 70–99)
HCT VFR BLD CALC: 42.2 % — SIGNIFICANT CHANGE UP (ref 42–52)
HGB BLD-MCNC: 14.2 G/DL — SIGNIFICANT CHANGE UP (ref 14–18)
IMM GRANULOCYTES NFR BLD AUTO: 1.5 % — HIGH (ref 0.1–0.3)
LYMPHOCYTES # BLD AUTO: 1.18 K/UL — LOW (ref 1.2–3.4)
LYMPHOCYTES # BLD AUTO: 6.9 % — LOW (ref 20.5–51.1)
MAGNESIUM SERPL-MCNC: 2 MG/DL — SIGNIFICANT CHANGE UP (ref 1.8–2.4)
MCHC RBC-ENTMCNC: 30.8 PG — SIGNIFICANT CHANGE UP (ref 27–31)
MCHC RBC-ENTMCNC: 33.6 G/DL — SIGNIFICANT CHANGE UP (ref 32–37)
MCV RBC AUTO: 91.5 FL — SIGNIFICANT CHANGE UP (ref 80–94)
MONOCYTES # BLD AUTO: 0.59 K/UL — SIGNIFICANT CHANGE UP (ref 0.1–0.6)
MONOCYTES NFR BLD AUTO: 3.4 % — SIGNIFICANT CHANGE UP (ref 1.7–9.3)
NEUTROPHILS # BLD AUTO: 15.1 K/UL — HIGH (ref 1.4–6.5)
NEUTROPHILS NFR BLD AUTO: 88 % — HIGH (ref 42.2–75.2)
NRBC # BLD: 0 /100 WBCS — SIGNIFICANT CHANGE UP (ref 0–0)
PLATELET # BLD AUTO: 170 K/UL — SIGNIFICANT CHANGE UP (ref 130–400)
POTASSIUM SERPL-MCNC: 4.5 MMOL/L — SIGNIFICANT CHANGE UP (ref 3.5–5)
POTASSIUM SERPL-SCNC: 4.5 MMOL/L — SIGNIFICANT CHANGE UP (ref 3.5–5)
PROT SERPL-MCNC: 5.5 G/DL — LOW (ref 6–8)
RBC # BLD: 4.61 M/UL — LOW (ref 4.7–6.1)
RBC # FLD: 12.1 % — SIGNIFICANT CHANGE UP (ref 11.5–14.5)
SODIUM SERPL-SCNC: 138 MMOL/L — SIGNIFICANT CHANGE UP (ref 135–146)
WBC # BLD: 17.15 K/UL — HIGH (ref 4.8–10.8)
WBC # FLD AUTO: 17.15 K/UL — HIGH (ref 4.8–10.8)

## 2021-01-16 PROCEDURE — 99233 SBSQ HOSP IP/OBS HIGH 50: CPT

## 2021-01-16 RX ADMIN — PANTOPRAZOLE SODIUM 40 MILLIGRAM(S): 20 TABLET, DELAYED RELEASE ORAL at 05:05

## 2021-01-16 RX ADMIN — Medication 1 TABLET(S): at 11:40

## 2021-01-16 RX ADMIN — APIXABAN 5 MILLIGRAM(S): 2.5 TABLET, FILM COATED ORAL at 05:05

## 2021-01-16 RX ADMIN — Medication 6 MILLIGRAM(S): at 05:05

## 2021-01-16 RX ADMIN — APIXABAN 5 MILLIGRAM(S): 2.5 TABLET, FILM COATED ORAL at 16:24

## 2021-01-16 RX ADMIN — ATORVASTATIN CALCIUM 40 MILLIGRAM(S): 80 TABLET, FILM COATED ORAL at 21:22

## 2021-01-16 RX ADMIN — ESCITALOPRAM OXALATE 10 MILLIGRAM(S): 10 TABLET, FILM COATED ORAL at 11:40

## 2021-01-16 RX ADMIN — BUPROPION HYDROCHLORIDE 300 MILLIGRAM(S): 150 TABLET, EXTENDED RELEASE ORAL at 11:40

## 2021-01-16 RX ADMIN — CHLORHEXIDINE GLUCONATE 1 APPLICATION(S): 213 SOLUTION TOPICAL at 05:08

## 2021-01-16 NOTE — PROGRESS NOTE ADULT - ASSESSMENT
61y/o male with pmhx of PE on eliquis 5 years ago secondary to factor 5 leiden, HLD, depression, congenital solitary kidney, vasovagal syncope presented from [facility] for hypoxia.    # Acute hypoxic respiratory failure secondary to COVID severe viral pneumonia with superimposed chemical pneumonitis, less likely bacterial pneumonia  - patient is in late inflammatory phase, covid ab's- positive - no RDV  - CXR 1/11: worsening b/l opacities  - LE duplex negative   - ferritin 1362 > 1274 (1/13)   D-dimer 590 > 931 (1/13)    - IV decadron to complete 10 days course at least  - s/p Toci x1 on 1/8 and 1/11  - prone position, OOBTC, PT    1/13: HFNC 60/60.  1/14: HFNC 60/50. Walking short distances comfortably.  1/15: HFNC 50/50. Walking short distances comfortably.  1/16: HF 50/50. Sat 95%. downtitrate.     # Transaminitis (secondary to covid) downtrending    # Hyperkalemia - improved after Lokelma     # h/o Pulmonary embolus secondary to factor V leiden  - hx of factor V leiden according to patient though no records in [facility] --> pt follows up at [facility]  - cont eliquis 5mg BID     # h/o Dyslipidemia - c/w atorvastatin  # h/o Depression- cont welbutrin 300mg daily and lexapro 10mg daily  # h/o  Solitary kidney - Creatinine stable .     Full code.

## 2021-01-17 LAB
ALBUMIN SERPL ELPH-MCNC: 3.3 G/DL — LOW (ref 3.5–5.2)
ALP SERPL-CCNC: 44 U/L — SIGNIFICANT CHANGE UP (ref 30–115)
ALT FLD-CCNC: 53 U/L — HIGH (ref 0–41)
ANION GAP SERPL CALC-SCNC: 8 MMOL/L — SIGNIFICANT CHANGE UP (ref 7–14)
AST SERPL-CCNC: 20 U/L — SIGNIFICANT CHANGE UP (ref 0–41)
BASOPHILS # BLD AUTO: 0.04 K/UL — SIGNIFICANT CHANGE UP (ref 0–0.2)
BASOPHILS NFR BLD AUTO: 0.2 % — SIGNIFICANT CHANGE UP (ref 0–1)
BILIRUB SERPL-MCNC: 0.4 MG/DL — SIGNIFICANT CHANGE UP (ref 0.2–1.2)
BUN SERPL-MCNC: 23 MG/DL — HIGH (ref 10–20)
CALCIUM SERPL-MCNC: 8.3 MG/DL — LOW (ref 8.5–10.1)
CHLORIDE SERPL-SCNC: 106 MMOL/L — SIGNIFICANT CHANGE UP (ref 98–110)
CO2 SERPL-SCNC: 24 MMOL/L — SIGNIFICANT CHANGE UP (ref 17–32)
CREAT SERPL-MCNC: 0.8 MG/DL — SIGNIFICANT CHANGE UP (ref 0.7–1.5)
EOSINOPHIL # BLD AUTO: 0.01 K/UL — SIGNIFICANT CHANGE UP (ref 0–0.7)
EOSINOPHIL NFR BLD AUTO: 0.1 % — SIGNIFICANT CHANGE UP (ref 0–8)
GLUCOSE SERPL-MCNC: 122 MG/DL — HIGH (ref 70–99)
HCT VFR BLD CALC: 40.9 % — LOW (ref 42–52)
HGB BLD-MCNC: 13.6 G/DL — LOW (ref 14–18)
IMM GRANULOCYTES NFR BLD AUTO: 1.9 % — HIGH (ref 0.1–0.3)
LYMPHOCYTES # BLD AUTO: 13.2 % — LOW (ref 20.5–51.1)
LYMPHOCYTES # BLD AUTO: 2.25 K/UL — SIGNIFICANT CHANGE UP (ref 1.2–3.4)
MAGNESIUM SERPL-MCNC: 2 MG/DL — SIGNIFICANT CHANGE UP (ref 1.8–2.4)
MCHC RBC-ENTMCNC: 30.6 PG — SIGNIFICANT CHANGE UP (ref 27–31)
MCHC RBC-ENTMCNC: 33.3 G/DL — SIGNIFICANT CHANGE UP (ref 32–37)
MCV RBC AUTO: 92.1 FL — SIGNIFICANT CHANGE UP (ref 80–94)
MONOCYTES # BLD AUTO: 1.33 K/UL — HIGH (ref 0.1–0.6)
MONOCYTES NFR BLD AUTO: 7.8 % — SIGNIFICANT CHANGE UP (ref 1.7–9.3)
NEUTROPHILS # BLD AUTO: 13.14 K/UL — HIGH (ref 1.4–6.5)
NEUTROPHILS NFR BLD AUTO: 76.8 % — HIGH (ref 42.2–75.2)
NRBC # BLD: 0 /100 WBCS — SIGNIFICANT CHANGE UP (ref 0–0)
PLATELET # BLD AUTO: 149 K/UL — SIGNIFICANT CHANGE UP (ref 130–400)
POTASSIUM SERPL-MCNC: 3.9 MMOL/L — SIGNIFICANT CHANGE UP (ref 3.5–5)
POTASSIUM SERPL-SCNC: 3.9 MMOL/L — SIGNIFICANT CHANGE UP (ref 3.5–5)
PROT SERPL-MCNC: 5.2 G/DL — LOW (ref 6–8)
RBC # BLD: 4.44 M/UL — LOW (ref 4.7–6.1)
RBC # FLD: 12 % — SIGNIFICANT CHANGE UP (ref 11.5–14.5)
SODIUM SERPL-SCNC: 138 MMOL/L — SIGNIFICANT CHANGE UP (ref 135–146)
WBC # BLD: 17.09 K/UL — HIGH (ref 4.8–10.8)
WBC # FLD AUTO: 17.09 K/UL — HIGH (ref 4.8–10.8)

## 2021-01-17 PROCEDURE — 99232 SBSQ HOSP IP/OBS MODERATE 35: CPT

## 2021-01-17 RX ADMIN — Medication 6 MILLIGRAM(S): at 05:41

## 2021-01-17 RX ADMIN — BUPROPION HYDROCHLORIDE 300 MILLIGRAM(S): 150 TABLET, EXTENDED RELEASE ORAL at 10:19

## 2021-01-17 RX ADMIN — CHLORHEXIDINE GLUCONATE 1 APPLICATION(S): 213 SOLUTION TOPICAL at 05:41

## 2021-01-17 RX ADMIN — PANTOPRAZOLE SODIUM 40 MILLIGRAM(S): 20 TABLET, DELAYED RELEASE ORAL at 05:41

## 2021-01-17 RX ADMIN — APIXABAN 5 MILLIGRAM(S): 2.5 TABLET, FILM COATED ORAL at 17:04

## 2021-01-17 RX ADMIN — ATORVASTATIN CALCIUM 40 MILLIGRAM(S): 80 TABLET, FILM COATED ORAL at 21:35

## 2021-01-17 RX ADMIN — Medication 5 MILLILITER(S): at 13:43

## 2021-01-17 RX ADMIN — ESCITALOPRAM OXALATE 10 MILLIGRAM(S): 10 TABLET, FILM COATED ORAL at 10:19

## 2021-01-17 RX ADMIN — APIXABAN 5 MILLIGRAM(S): 2.5 TABLET, FILM COATED ORAL at 05:41

## 2021-01-17 RX ADMIN — Medication 1 TABLET(S): at 10:18

## 2021-01-17 NOTE — PROGRESS NOTE ADULT - ASSESSMENT
61y/o male with pmhx of PE on eliquis 5 years ago secondary to factor 5 leiden, HLD, depression, congenital solitary kidney, vasovagal syncope presented from [facility] for hypoxia.    # Acute hypoxic respiratory failure secondary to COVID severe viral pneumonia with superimposed chemical pneumonitis, less likely bacterial pneumonia  - patient is in late inflammatory phase, covid ab's- positive - no RDV  - CXR 1/11: worsening b/l opacities  - LE duplex negative   - ferritin 1362 > 1274 (1/13)   D-dimer 590 > 931 (1/13)    - IV decadron to complete 10 days course at least  - s/p Toci x1 on 1/8 and 1/11  - prone position, OOBTC, PT    1/13: HFNC 60/60.  1/14: HFNC 60/50. Walking short distances comfortably.  1/15: HFNC 50/50. Walking short distances comfortably.  1/16: HF 50/50. Sat 95%. downtitrate.   1/17: HF 60/40. Sat 96%. downtitrate as able. C/w IV dexa     # Transaminitis (secondary to covid) downtrending    # Hyperkalemia - improved after Lokelma     # h/o Pulmonary embolus secondary to factor V leiden  - hx of factor V leiden according to patient though no records in [facility] --> pt follows up at [facility]  - cont eliquis 5mg BID     # h/o Dyslipidemia - c/w atorvastatin  # h/o Depression- cont welbutrin 300mg daily and lexapro 10mg daily  # h/o  Solitary kidney - Creatinine stable .     Full code.

## 2021-01-18 LAB
ALBUMIN SERPL ELPH-MCNC: 3.3 G/DL — LOW (ref 3.5–5.2)
ALP SERPL-CCNC: 41 U/L — SIGNIFICANT CHANGE UP (ref 30–115)
ALT FLD-CCNC: 47 U/L — HIGH (ref 0–41)
ANION GAP SERPL CALC-SCNC: 11 MMOL/L — SIGNIFICANT CHANGE UP (ref 7–14)
AST SERPL-CCNC: 17 U/L — SIGNIFICANT CHANGE UP (ref 0–41)
BASOPHILS # BLD AUTO: 0.06 K/UL — SIGNIFICANT CHANGE UP (ref 0–0.2)
BASOPHILS NFR BLD AUTO: 0.4 % — SIGNIFICANT CHANGE UP (ref 0–1)
BILIRUB SERPL-MCNC: 0.7 MG/DL — SIGNIFICANT CHANGE UP (ref 0.2–1.2)
BUN SERPL-MCNC: 24 MG/DL — HIGH (ref 10–20)
CALCIUM SERPL-MCNC: 8.5 MG/DL — SIGNIFICANT CHANGE UP (ref 8.5–10.1)
CHLORIDE SERPL-SCNC: 103 MMOL/L — SIGNIFICANT CHANGE UP (ref 98–110)
CO2 SERPL-SCNC: 26 MMOL/L — SIGNIFICANT CHANGE UP (ref 17–32)
CREAT SERPL-MCNC: 0.9 MG/DL — SIGNIFICANT CHANGE UP (ref 0.7–1.5)
EOSINOPHIL # BLD AUTO: 0.04 K/UL — SIGNIFICANT CHANGE UP (ref 0–0.7)
EOSINOPHIL NFR BLD AUTO: 0.3 % — SIGNIFICANT CHANGE UP (ref 0–8)
GLUCOSE SERPL-MCNC: 182 MG/DL — HIGH (ref 70–99)
HCT VFR BLD CALC: 40.5 % — LOW (ref 42–52)
HGB BLD-MCNC: 13.6 G/DL — LOW (ref 14–18)
IMM GRANULOCYTES NFR BLD AUTO: 2.3 % — HIGH (ref 0.1–0.3)
LYMPHOCYTES # BLD AUTO: 16.8 % — LOW (ref 20.5–51.1)
LYMPHOCYTES # BLD AUTO: 2.37 K/UL — SIGNIFICANT CHANGE UP (ref 1.2–3.4)
MAGNESIUM SERPL-MCNC: 2.2 MG/DL — SIGNIFICANT CHANGE UP (ref 1.8–2.4)
MCHC RBC-ENTMCNC: 30.7 PG — SIGNIFICANT CHANGE UP (ref 27–31)
MCHC RBC-ENTMCNC: 33.6 G/DL — SIGNIFICANT CHANGE UP (ref 32–37)
MCV RBC AUTO: 91.4 FL — SIGNIFICANT CHANGE UP (ref 80–94)
MONOCYTES # BLD AUTO: 0.84 K/UL — HIGH (ref 0.1–0.6)
MONOCYTES NFR BLD AUTO: 5.9 % — SIGNIFICANT CHANGE UP (ref 1.7–9.3)
NEUTROPHILS # BLD AUTO: 10.49 K/UL — HIGH (ref 1.4–6.5)
NEUTROPHILS NFR BLD AUTO: 74.3 % — SIGNIFICANT CHANGE UP (ref 42.2–75.2)
NRBC # BLD: 0 /100 WBCS — SIGNIFICANT CHANGE UP (ref 0–0)
PLATELET # BLD AUTO: 126 K/UL — LOW (ref 130–400)
POTASSIUM SERPL-MCNC: 4.2 MMOL/L — SIGNIFICANT CHANGE UP (ref 3.5–5)
POTASSIUM SERPL-SCNC: 4.2 MMOL/L — SIGNIFICANT CHANGE UP (ref 3.5–5)
PROT SERPL-MCNC: 5.3 G/DL — LOW (ref 6–8)
RBC # BLD: 4.43 M/UL — LOW (ref 4.7–6.1)
RBC # FLD: 12.1 % — SIGNIFICANT CHANGE UP (ref 11.5–14.5)
SODIUM SERPL-SCNC: 140 MMOL/L — SIGNIFICANT CHANGE UP (ref 135–146)
WBC # BLD: 14.13 K/UL — HIGH (ref 4.8–10.8)
WBC # FLD AUTO: 14.13 K/UL — HIGH (ref 4.8–10.8)

## 2021-01-18 PROCEDURE — 99233 SBSQ HOSP IP/OBS HIGH 50: CPT

## 2021-01-18 RX ORDER — DEXAMETHASONE 0.5 MG/5ML
6 ELIXIR ORAL DAILY
Refills: 0 | Status: DISCONTINUED | OUTPATIENT
Start: 2021-01-18 | End: 2021-01-20

## 2021-01-18 RX ADMIN — Medication 6 MILLIGRAM(S): at 05:19

## 2021-01-18 RX ADMIN — ESCITALOPRAM OXALATE 10 MILLIGRAM(S): 10 TABLET, FILM COATED ORAL at 10:21

## 2021-01-18 RX ADMIN — APIXABAN 5 MILLIGRAM(S): 2.5 TABLET, FILM COATED ORAL at 05:19

## 2021-01-18 RX ADMIN — ATORVASTATIN CALCIUM 40 MILLIGRAM(S): 80 TABLET, FILM COATED ORAL at 22:13

## 2021-01-18 RX ADMIN — BUPROPION HYDROCHLORIDE 300 MILLIGRAM(S): 150 TABLET, EXTENDED RELEASE ORAL at 10:21

## 2021-01-18 RX ADMIN — CHLORHEXIDINE GLUCONATE 1 APPLICATION(S): 213 SOLUTION TOPICAL at 05:19

## 2021-01-18 RX ADMIN — PANTOPRAZOLE SODIUM 40 MILLIGRAM(S): 20 TABLET, DELAYED RELEASE ORAL at 05:19

## 2021-01-18 RX ADMIN — Medication 1 TABLET(S): at 10:21

## 2021-01-18 RX ADMIN — APIXABAN 5 MILLIGRAM(S): 2.5 TABLET, FILM COATED ORAL at 17:03

## 2021-01-18 NOTE — PROGRESS NOTE ADULT - ASSESSMENT
59y/o male with pmhx of PE on eliquis 5 years ago secondary to factor 5 leiden, HLD, depression, congenital solitary kidney, vasovagal syncope presented from [facility] for hypoxia.    # Acute hypoxic respiratory failure secondary to COVID severe viral pneumonia with superimposed chemical pneumonitis, less likely bacterial pneumonia  - patient is in late inflammatory phase, COVID Ab positive, therefore not a candidate for RDV  - CXR 1/11: worsening b/l opacities  - LE duplex negative   - inflammatory markers:    ferritin 1362 > 1274 (1/13)    D-dimer 590 > 931 (1/13)     Procal 0.04    CRP 0.76 > 0.10  - IV decadron completed 10 days  - s/p Toci x1 on 1/8 and 1/11  - prone position, OOBTC, PT  - currently on HF 40% saturating at 96%    # Transaminitis (secondary to covid) downtrending    # Hyperkalemia - improved after Lokelma     # h/o Pulmonary embolus secondary to factor V leiden  - hx of factor V leiden according to patient though no records in [facility] --> pt follows up at [facility]  - cont eliquis 5mg BID     # h/o Dyslipidemia - c/w atorvastatin  # h/o Depression- cont welbutrin 300mg daily and lexapro 10mg daily  # h/o  Solitary kidney - Creatinine stable .     Full code.     61y/o male with pmhx of PE on eliquis 5 years ago secondary to factor 5 leiden, HLD, depression, congenital solitary kidney, vasovagal syncope presented from [facility] for hypoxia.    # Acute hypoxic respiratory failure secondary to COVID severe viral pneumonia with superimposed chemical pneumonitis, less likely bacterial pneumonia  - patient is in late inflammatory phase, COVID Ab positive, therefore not a candidate for RDV  - CXR 1/11: worsening b/l opacities  - LE duplex negative   - inflammatory markers:    ferritin 1362 > 1274 (1/13)    D-dimer 590 > 931 (1/13)     Procal 0.04    CRP 0.76 > 0.10  - IV decadron completed 10 days, continue with Decadron  - s/p Toci x1 on 1/8 and 1/11  - prone position, OOBTC, PT  - currently on HF 40% saturating at 96%, attempt to wean as tolerated  - labs every other day    # Transaminitis (secondary to covid) - improving    # Hyperkalemia - improved after Lokelma     # h/o Pulmonary embolus secondary to factor V leiden  - hx of factor V leiden according to patient though no records in [facility] --> pt follows up at [facility]  - cont eliquis 5mg BID     # h/o Dyslipidemia - c/w atorvastatin  # h/o Depression- cont welbutrin 300mg daily and lexapro 10mg daily  # h/o  Solitary kidney - Creatinine stable     DVT PPx: Lovenox  GI PPx: PPI  FULL CODE

## 2021-01-18 NOTE — PROGRESS NOTE ADULT - ATTENDING COMMENTS
61y/o male with pmhx of PE on eliquis 5 years ago secondary to factor 5 leiden, HLD, depression, congenital solitary kidney, vasovagal syncope presented from [facility] for hypoxia.    # Acute hypoxic respiratory failure secondary to COVID severe viral pneumonia with superimposed chemical pneumonitis, less likely bacterial pneumonia  - patient is in late inflammatory phase, covid ab's- positive - no RDV  - CXR 1/11: worsening b/l opacities  - LE duplex negative   - ferritin 1362 > 1274 (1/13)   D-dimer 590 > 931 (1/13)    - IV decadron to complete 10 days course at least  - s/p Toci x1 on 1/8 and 1/11  - prone position, OOBTC, PT    1/13: HFNC 60/60.  1/14: HFNC 60/50. Walking short distances comfortably.  1/15: HFNC 50/50. Walking short distances comfortably.  1/16: HF 50/50. Sat 95%. downtitrate.   1/17: HF 60/40. Sat 96%. downtitrate as able. C/w IV dexa   1/18: HF 60/40.  downtitrate as able. C/w IV dexa       # Transaminitis (secondary to covid) downtrending    # Hyperkalemia - improved after Lokelma     # h/o Pulmonary embolus secondary to factor V leiden  - hx of factor V leiden according to patient though no records in [facility] --> pt follows up at [facility]  - cont eliquis 5mg BID     # h/o Dyslipidemia - c/w atorvastatin  # h/o Depression- cont welbutrin 300mg daily and lexapro 10mg daily  # h/o  Solitary kidney - Creatinine stable .     Full code.

## 2021-01-19 PROCEDURE — 99233 SBSQ HOSP IP/OBS HIGH 50: CPT

## 2021-01-19 RX ADMIN — ESCITALOPRAM OXALATE 10 MILLIGRAM(S): 10 TABLET, FILM COATED ORAL at 10:22

## 2021-01-19 RX ADMIN — Medication 1 TABLET(S): at 10:22

## 2021-01-19 RX ADMIN — PANTOPRAZOLE SODIUM 40 MILLIGRAM(S): 20 TABLET, DELAYED RELEASE ORAL at 05:52

## 2021-01-19 RX ADMIN — BUPROPION HYDROCHLORIDE 300 MILLIGRAM(S): 150 TABLET, EXTENDED RELEASE ORAL at 10:22

## 2021-01-19 RX ADMIN — ATORVASTATIN CALCIUM 40 MILLIGRAM(S): 80 TABLET, FILM COATED ORAL at 21:43

## 2021-01-19 RX ADMIN — APIXABAN 5 MILLIGRAM(S): 2.5 TABLET, FILM COATED ORAL at 05:51

## 2021-01-19 RX ADMIN — APIXABAN 5 MILLIGRAM(S): 2.5 TABLET, FILM COATED ORAL at 16:44

## 2021-01-19 RX ADMIN — Medication 6 MILLIGRAM(S): at 05:52

## 2021-01-19 RX ADMIN — CHLORHEXIDINE GLUCONATE 1 APPLICATION(S): 213 SOLUTION TOPICAL at 05:51

## 2021-01-19 NOTE — PROGRESS NOTE ADULT - ASSESSMENT
61y/o male with pmhx of PE on eliquis 5 years ago secondary to factor 5 leiden, HLD, depression, congenital solitary kidney, vasovagal syncope presented from [facility] for hypoxia.    # Acute hypoxic respiratory failure secondary to COVID severe viral pneumonia with superimposed chemical pneumonitis, less likely bacterial pneumonia  - patient is in late inflammatory phase, COVID Ab positive, therefore not a candidate for RDV  - CXR 1/11: worsening b/l opacities  - LE duplex negative   - inflammatory markers:    ferritin 1362 > 1274 (1/13)    D-dimer 590 > 931 (1/13)     Procal 0.04    CRP 0.76 > 0.10  - IV decadron completed 10 days, continue with Decadron  - s/p Toci x1 on 1/8 and 1/11  - prone position, OOBTC, PT  - currently on HF 40% saturating at 96%, switch to NC today and monitor Pulse ox  - labs every other day    # Transaminitis (secondary to covid) - improving    # Hyperkalemia - improved after Lokelma     # h/o Pulmonary embolus secondary to factor V leiden  - hx of factor V leiden according to patient though no records in [facility] --> pt follows up at [facility]  - cont eliquis 5mg BID     # h/o Dyslipidemia - c/w atorvastatin  # h/o Depression- cont welbutrin 300mg daily and lexapro 10mg daily  # h/o  Solitary kidney - Creatinine stable     DVT PPx: Lovenox  GI PPx: PPI  FULL CODE

## 2021-01-19 NOTE — PROGRESS NOTE ADULT - ATTENDING COMMENTS
59y/o male with pmhx of PE on eliquis 5 years ago secondary to factor 5 leiden, HLD, depression, congenital solitary kidney, vasovagal syncope presented from [facility] for hypoxia.    # Acute hypoxic respiratory failure secondary to COVID severe viral pneumonia with superimposed chemical pneumonitis, less likely bacterial pneumonia  - patient is in late inflammatory phase, covid ab's- positive - no RDV  - CXR 1/11: worsening b/l opacities  - LE duplex negative   - ferritin 1362 > 1274 (1/13)   D-dimer 590 > 931 (1/13)    - IV decadron to complete 10 days course at least  - s/p Toci x1 on 1/8 and 1/11  - prone position, OOBTC, PT    1/13: HFNC 60/60.  1/14: HFNC 60/50. Walking short distances comfortably.  1/15: HFNC 50/50. Walking short distances comfortably.  1/16: HF 50/50. Sat 95%. downtitrate.   1/17: HF 60/40. Sat 96%. downtitrate as able. C/w IV dexa   1/18: HF 60/40.  downtitrate as able. C/w IV dexa   1/19: 6L NC. comfortable. downtitrate as able. May d/c dexa 1/21 or taper off. D/c home w/ home O2 around 1/21?    # Transaminitis (secondary to covid) downtrending    # Hyperkalemia - improved after Lokelma     # h/o Pulmonary embolus secondary to factor V leiden  - hx of factor V leiden according to patient though no records in [facility] --> pt follows up at [facility]  - cont eliquis 5mg BID     # h/o Dyslipidemia - c/w atorvastatin  # h/o Depression- cont welbutrin 300mg daily and lexapro 10mg daily  # h/o  Solitary kidney - Creatinine stable .     Full Code

## 2021-01-19 NOTE — CHART NOTE - NSCHARTNOTEFT_GEN_A_CORE
I made rounds today with the treatment team including the hospitalist, residents,  nurses and  and discussed the patient's current medical status and discharge  planning needs, and reviewed the chart.    T(C): 36.1 (01-19-21 @ 05:19), Max: 37.1 (01-18-21 @ 20:30)  HR: 89 (01-19-21 @ 05:19) (89 - 104)  BP: 124/72 (01-19-21 @ 05:19) (107/58 - 124/72)  RR: 18 (01-19-21 @ 05:19) (18 - 18)  SpO2: 97% (01-19-21 @ 10:20) (95% - 97%)          I reached out to the patient's health care proxy/ responsible family member-           [     ]  I reached                                     and discussed the patient's medical condition,                   family concerns, and discharge planning           [     ]  I left a message with family               [   x  ]  I personally participated in rounds with the medical team and my resident and discussed the case. My resident reached                   family member/ HCP     Sheeba [phone]                           under my direction and supervision  and we reviewed the conversation.          [     ]  My resident left a message with family under my direction and supervision           [     ]   My resident attended medical rounds and called the family                [  x    ]    The following was discussed:  The patient's medical status over the past 24 hrs was reviewed, as well as oxygen needs and medication changes and labs. On HFNC O2 40/40. To try to wean to 6 liters NC today. Reported to appear comfortable.          [   x   ]   The following concerns were raised: asking when next chest x-ray is and how long he will be on steroids. (Will relay to medical team)          [     ] I spent 5-10 minutes on the above discussing medical issues with team members and family and/ or my resident    [   x  ] I spent 11-20 minutes on the above discussing medical issues with team members and family and/ or my resident    [     ] I spent 21-30 minutes on the above discussing medical issues with team members and family and/ or my resident

## 2021-01-20 ENCOUNTER — TRANSCRIPTION ENCOUNTER (OUTPATIENT)
Age: 61
End: 2021-01-20

## 2021-01-20 VITALS
OXYGEN SATURATION: 92 % | HEART RATE: 98 BPM | RESPIRATION RATE: 19 BRPM | TEMPERATURE: 98 F | SYSTOLIC BLOOD PRESSURE: 121 MMHG | DIASTOLIC BLOOD PRESSURE: 71 MMHG

## 2021-01-20 LAB
ALBUMIN SERPL ELPH-MCNC: 3.7 G/DL — SIGNIFICANT CHANGE UP (ref 3.5–5.2)
ALP SERPL-CCNC: 40 U/L — SIGNIFICANT CHANGE UP (ref 30–115)
ALT FLD-CCNC: 50 U/L — HIGH (ref 0–41)
ANION GAP SERPL CALC-SCNC: 9 MMOL/L — SIGNIFICANT CHANGE UP (ref 7–14)
AST SERPL-CCNC: 20 U/L — SIGNIFICANT CHANGE UP (ref 0–41)
BASOPHILS # BLD AUTO: 0 K/UL — SIGNIFICANT CHANGE UP (ref 0–0.2)
BASOPHILS NFR BLD AUTO: 0 % — SIGNIFICANT CHANGE UP (ref 0–1)
BILIRUB SERPL-MCNC: 0.7 MG/DL — SIGNIFICANT CHANGE UP (ref 0.2–1.2)
BUN SERPL-MCNC: 26 MG/DL — HIGH (ref 10–20)
CALCIUM SERPL-MCNC: 8.8 MG/DL — SIGNIFICANT CHANGE UP (ref 8.5–10.1)
CHLORIDE SERPL-SCNC: 102 MMOL/L — SIGNIFICANT CHANGE UP (ref 98–110)
CO2 SERPL-SCNC: 27 MMOL/L — SIGNIFICANT CHANGE UP (ref 17–32)
CREAT SERPL-MCNC: 0.9 MG/DL — SIGNIFICANT CHANGE UP (ref 0.7–1.5)
EOSINOPHIL # BLD AUTO: 0 K/UL — SIGNIFICANT CHANGE UP (ref 0–0.7)
EOSINOPHIL NFR BLD AUTO: 0 % — SIGNIFICANT CHANGE UP (ref 0–8)
GIANT PLATELETS BLD QL SMEAR: PRESENT — SIGNIFICANT CHANGE UP
GLUCOSE SERPL-MCNC: 151 MG/DL — HIGH (ref 70–99)
HCT VFR BLD CALC: 42.9 % — SIGNIFICANT CHANGE UP (ref 42–52)
HGB BLD-MCNC: 14.7 G/DL — SIGNIFICANT CHANGE UP (ref 14–18)
LYMPHOCYTES # BLD AUTO: 12.2 % — LOW (ref 20.5–51.1)
LYMPHOCYTES # BLD AUTO: 3.01 K/UL — SIGNIFICANT CHANGE UP (ref 1.2–3.4)
MAGNESIUM SERPL-MCNC: 2.1 MG/DL — SIGNIFICANT CHANGE UP (ref 1.8–2.4)
MANUAL SMEAR VERIFICATION: SIGNIFICANT CHANGE UP
MCHC RBC-ENTMCNC: 31.4 PG — HIGH (ref 27–31)
MCHC RBC-ENTMCNC: 34.3 G/DL — SIGNIFICANT CHANGE UP (ref 32–37)
MCV RBC AUTO: 91.7 FL — SIGNIFICANT CHANGE UP (ref 80–94)
MONOCYTES # BLD AUTO: 1.5 K/UL — HIGH (ref 0.1–0.6)
MONOCYTES NFR BLD AUTO: 6.1 % — SIGNIFICANT CHANGE UP (ref 1.7–9.3)
NEUTROPHILS # BLD AUTO: 19.73 K/UL — HIGH (ref 1.4–6.5)
NEUTROPHILS NFR BLD AUTO: 80 % — HIGH (ref 42.2–75.2)
PLAT MORPH BLD: NORMAL — SIGNIFICANT CHANGE UP
PLATELET # BLD AUTO: 149 K/UL — SIGNIFICANT CHANGE UP (ref 130–400)
POTASSIUM SERPL-MCNC: 4.5 MMOL/L — SIGNIFICANT CHANGE UP (ref 3.5–5)
POTASSIUM SERPL-SCNC: 4.5 MMOL/L — SIGNIFICANT CHANGE UP (ref 3.5–5)
PROT SERPL-MCNC: 5.5 G/DL — LOW (ref 6–8)
RBC # BLD: 4.68 M/UL — LOW (ref 4.7–6.1)
RBC # FLD: 12.5 % — SIGNIFICANT CHANGE UP (ref 11.5–14.5)
RBC BLD AUTO: NORMAL — SIGNIFICANT CHANGE UP
SODIUM SERPL-SCNC: 138 MMOL/L — SIGNIFICANT CHANGE UP (ref 135–146)
VARIANT LYMPHS # BLD: 1.7 % — SIGNIFICANT CHANGE UP (ref 0–5)
WBC # BLD: 24.66 K/UL — HIGH (ref 4.8–10.8)
WBC # FLD AUTO: 24.66 K/UL — HIGH (ref 4.8–10.8)

## 2021-01-20 PROCEDURE — 99233 SBSQ HOSP IP/OBS HIGH 50: CPT

## 2021-01-20 PROCEDURE — 71045 X-RAY EXAM CHEST 1 VIEW: CPT | Mod: 26

## 2021-01-20 RX ADMIN — ESCITALOPRAM OXALATE 10 MILLIGRAM(S): 10 TABLET, FILM COATED ORAL at 10:39

## 2021-01-20 RX ADMIN — APIXABAN 5 MILLIGRAM(S): 2.5 TABLET, FILM COATED ORAL at 05:42

## 2021-01-20 RX ADMIN — Medication 1 TABLET(S): at 10:39

## 2021-01-20 RX ADMIN — BUPROPION HYDROCHLORIDE 300 MILLIGRAM(S): 150 TABLET, EXTENDED RELEASE ORAL at 10:39

## 2021-01-20 RX ADMIN — ATORVASTATIN CALCIUM 40 MILLIGRAM(S): 80 TABLET, FILM COATED ORAL at 21:30

## 2021-01-20 RX ADMIN — Medication 6 MILLIGRAM(S): at 05:43

## 2021-01-20 RX ADMIN — APIXABAN 5 MILLIGRAM(S): 2.5 TABLET, FILM COATED ORAL at 16:12

## 2021-01-20 RX ADMIN — CHLORHEXIDINE GLUCONATE 1 APPLICATION(S): 213 SOLUTION TOPICAL at 05:42

## 2021-01-20 RX ADMIN — PANTOPRAZOLE SODIUM 40 MILLIGRAM(S): 20 TABLET, DELAYED RELEASE ORAL at 05:43

## 2021-01-20 NOTE — DISCHARGE NOTE NURSING/CASE MANAGEMENT/SOCIAL WORK - PATIENT PORTAL LINK FT
You can access the FollowMyHealth Patient Portal offered by [facility] by registering at the following website: [email or web address]. By joining [facility]’s FollowMyHealth portal, you will also be able to view your health information using other applications (apps) compatible with our system.

## 2021-01-20 NOTE — CHART NOTE - NSCHARTNOTEFT_GEN_A_CORE
I made rounds today with the treatment team including the hospitalist, residents,  nurses and  and discussed the patient's current medical status and discharge  planning needs, and reviewed the chart.    T(C): 36.3 (01-20-21 @ 14:08), Max: 36.4 (01-19-21 @ 21:00)  HR: 106 (01-20-21 @ 14:08) (72 - 106)  BP: 123/71 (01-20-21 @ 14:08) (123/71 - 135/75)  RR: 18 (01-20-21 @ 14:08) (18 - 18)  SpO2: 91% (01-20-21 @ 14:08) (88% - 95%)          I reached out to the patient's health care proxy/ responsible family member-           [     ]  I reached                                     and discussed the patient's medical condition,                   family concerns, and discharge planning           [     ]  I left a message with family               [   x  ]  I personally participated in rounds with the medical team and my resident and discussed the case. My resident reached                   family member/ HCP       wife, Sheeba                         under my direction and supervision  and we reviewed the conversation.          [     ]  My resident left a message with family under my direction and supervision           [     ]   My resident attended medical rounds and called the family                [  x    ]    The following was discussed:  The patient's medical status over the past 24 hrs was reviewed, as well as oxygen needs and medication changes and labs. Weaned down to 4 liters [place] O2. Feels better. To change decadron to PO prednisone. Plan to repeat chest x-ray. Able to ambulate. Will check ambulatory pulse ox. For d/c planning on home O2.          [   x   ]   The following concerns were raised: none          [     ] I spent 5-10 minutes on the above discussing medical issues with team members and family and/ or my resident    [   x  ] I spent 11-20 minutes on the above discussing medical issues with team members and family and/ or my resident    [     ] I spent 21-30 minutes on the above discussing medical issues with team members and family and/ or my resident

## 2021-01-20 NOTE — PROGRESS NOTE ADULT - REASON FOR ADMISSION
hypoxia/covid

## 2021-01-20 NOTE — PROGRESS NOTE ADULT - SUBJECTIVE AND OBJECTIVE BOX
HPI  Patient is a 60y old Male who presents with a chief complaint of hypoxia/covid (19 Jan 2021 09:17)    Currently admitted to medicine with the primary diagnosis of Hypoxia       Today is hospital day 13d.     INTERVAL HPI / OVERNIGHT EVENTS:  Patient was examined and seen at bedside. This morning he is resting comfortably in bed and reports no new issues or overnight events. He reports that his breathing is very stable. He denies any chest pain, fevers, headache, cough.     ROS: Otherwise unremarkable     PAST MEDICAL & SURGICAL HISTORY  Solitary kidney    Depression    Hyperlipidemia    PE (pulmonary thromboembolism)    No pertinent past surgical history      ALLERGIES  No Known Allergies    MEDICATIONS  STANDING MEDICATIONS  apixaban 5 milliGRAM(s) Oral every 12 hours  atorvastatin Oral Tab/Cap - Peds 40 milliGRAM(s) Oral daily  buPROPion XL . 300 milliGRAM(s) Oral daily  chlorhexidine 4% Liquid 1 Application(s) Topical <User Schedule>  escitalopram 10 milliGRAM(s) Oral daily  multivitamin 1 Tablet(s) Oral daily  pantoprazole    Tablet 40 milliGRAM(s) Oral before breakfast  predniSONE   Tablet 20 milliGRAM(s) Oral daily    PRN MEDICATIONS  guaifenesin/dextromethorphan  Syrup 5 milliLiter(s) Oral four times a day PRN    VITALS:  T(F): 97  HR: 72  BP: 126/89  RR: 18  SpO2: 88%    PHYSICAL EXAM  GEN: NAD, Resting comfortably in bed  PULM: Clear to auscultation bilaterally, No wheezes  CVS: Regular rate and rhythm, S1-S2, no murmurs  ABD: Soft, non-tender, non-distended, no guarding  EXT: No edema  NEURO: A&Ox3, no focal deficits    LABS                        14.7   24.66 )-----------( 149      ( 20 Jan 2021 05:59 )             42.9     01-20    138  |  102  |  26<H>  ----------------------------<  151<H>  4.5   |  27  |  0.9    Ca    8.8      20 Jan 2021 05:59  Mg     2.1     01-20    TPro  5.5<L>  /  Alb  3.7  /  TBili  0.7  /  DBili  x   /  AST  20  /  ALT  50<H>  /  AlkPhos  40  01-20                  RADIOLOGY    EXAM:  XR CHEST PORTABLE URGENT 1V            PROCEDURE DATE:  01/20/2021            INTERPRETATION:  STUDY INDICATION: Pneumonia    Comparison: XR CHEST 1/13/2021.    Technique/Positioning: Frontal view of the chest.    Findings:    Support devices: None.    Cardiac/mediastinum/hilum: Stable appearance of the cardiomediastinal silhouette.    Lung parenchyma/Pleura: Decreased bilateral predominantly peripheral hazy and consolidative opacities. No pneumothorax.    Skeleton/soft tissues: Unchanged.    Impression:    Decreased bilateral predominantly peripheral hazy and consolidative opacities. No pneumothorax.    
ROSALVA DEAN    Patient is a 60y old  Male who presents with a chief complaint of hypoxia/covid (08 Jan 2021 10:53)    INTERVAL HPI/OVERNIGHT EVENTS: no events overnight, pt feels very anxious when he hears pulseox beeps, still short of breath, remains on HFNC, FiO2 down to 70%, 60L    ROS: All ROS negative except as documented above     PHYSICAL EXAM:  T(C): 36.2, Max: 36.6 (01-08-21 @ 22:17)  HR: 87 (74 - 87)  BP: 106/64 (106/64 - 140/82)  RR: 20 (18 - 20)  SpO2: 93% (92% - 96%)    GENERAL: anxious  PULMONARY/CHEST: decreased breath sounds  CARDIOVASC: Regular rate and rhythm; No murmurs  GI/ABDOMEN: Soft, Nontender, Nondistended; Bowel sounds present  EXTREMITIES:  2+ Peripheral Pulses, No clubbing, cyanosis, or edema, no deformity. No calf tenderness b/l.  NERVOUS SYSTEM:  Alert & Oriented X3, no focal deficit     LABS:                        14.3   14.54 )-----------( 415      ( 09 Jan 2021 08:25 )             42.5     01-09    139  |  104  |  25<H>  ----------------------------<  147<H>  5.2<H>   |  27  |  0.9    Ca    8.1<L>      09 Jan 2021 08:25  Mg     2.7     01-09    TPro  6.0  /  Alb  3.4<L>  /  TBili  0.6  /  DBili  0.2  /  AST  31  /  ALT  61<H>  /  AlkPhos  68  01-09      Culture - Blood (collected 07 Jan 2021 00:25)  Source: .Blood Blood-Peripheral  Preliminary Report (08 Jan 2021 07:02):    No growth to date.    Culture - Blood (collected 07 Jan 2021 00:25)  Source: .Blood Blood-Peripheral  Preliminary Report (08 Jan 2021 07:02):    No growth to date.      RADIOLOGY & ADDITIONAL TESTS:  no new tests      MEDICATIONS  (STANDING):  ampicillin/sulbactam  IVPB      ampicillin/sulbactam  IVPB 3 Gram(s) IV Intermittent every 6 hours  apixaban 5 milliGRAM(s) Oral every 12 hours  atorvastatin Oral Tab/Cap - Peds 40 milliGRAM(s) Oral daily  buPROPion XL . 300 milliGRAM(s) Oral daily  chlorhexidine 4% Liquid 1 Application(s) Topical <User Schedule>  dexAMETHasone  Injectable 6 milliGRAM(s) IV Push daily  escitalopram 10 milliGRAM(s) Oral daily  pantoprazole    Tablet 40 milliGRAM(s) Oral before breakfast  sodium zirconium cyclosilicate 5 Gram(s) Oral two times a day    MEDICATIONS  (PRN):  guaifenesin/dextromethorphan  Syrup 5 milliLiter(s) Oral four times a day PRN Cough      
----------Daily Progress Note----------    HISTORY OF PRESENT ILLNESS:  Patient is a 60y old Male who presents with a chief complaint of hypoxia/covid (07 Jan 2021 16:24)    Currently admitted to medicine with the primary diagnosis of Hypoxia       Today is hospital day 1d.     INTERVAL HOSPITAL COURSE / OVERNIGHT EVENTS:    Patient was examined and seen at bedside. This morning he is resting comfortably in bed and reports no new issues or overnight events.     Review of Systems: Otherwise unremarkable     <<<<<PAST MEDICAL & SURGICAL HISTORY>>>>>  Solitary kidney    Depression    Hyperlipidemia    PE (pulmonary thromboembolism)    No pertinent past surgical history      ALLERGIES  No Known Allergies    MEDICATIONS  STANDING MEDICATIONS  ampicillin/sulbactam  IVPB 3 Gram(s) IV Intermittent every 6 hours  ampicillin/sulbactam  IVPB      apixaban 5 milliGRAM(s) Oral every 12 hours  atorvastatin Oral Tab/Cap - Peds 40 milliGRAM(s) Oral daily  buPROPion XL . 300 milliGRAM(s) Oral daily  chlorhexidine 4% Liquid 1 Application(s) Topical <User Schedule>  dexAMETHasone  Injectable 6 milliGRAM(s) IV Push daily  escitalopram 10 milliGRAM(s) Oral daily  pantoprazole    Tablet 40 milliGRAM(s) Oral before breakfast  tocilizumab IVPB 400 milliGRAM(s) IV Intermittent once    PRN MEDICATIONS  guaifenesin/dextromethorphan  Syrup 5 milliLiter(s) Oral four times a day PRN    VITALS:  T(F): 96.3  HR: 72  BP: 124/74  RR: 19  SpO2: 94%    <<<<<PHYSICAL EXAM>>>>>  GENERAL: Well developed, well nourished and in no acute distress. Resting comfortably in bed.  PULMONARY: Clear to auscultation bilaterally. No rales, rhonchi, or wheezing.  CARDIOVASCULAR: Regular rate and rhythm, S1-S2, no murmurs  GASTROINTESTINAL: Soft, non-tender, non-distended, no guarding.  RENAL: No CVA tenderness.  SKIN/EXTREMITIES: No clubbing or edema  NEUROLOGIC/MUSCULOSKELETAL: AOx4, grossly moving all extremities, no focal deficits.    <<<<<LABS>>>>>                        13.4   14.82 )-----------( 379      ( 08 Jan 2021 06:30 )             41.0     01-08    139  |  103  |  23<H>  ----------------------------<  148<H>  5.2<H>   |  26  |  1.0    Ca    8.5      08 Jan 2021 06:30  Mg     2.6     01-08    TPro  5.9<L>  /  Alb  3.6  /  TBili  0.4  /  DBili  x   /  AST  47<H>  /  ALT  73<H>  /  AlkPhos  75  01-07    PT/INR - ( 07 Jan 2021 00:25 )   PT: 15.10 sec;   INR: 1.31 ratio         PTT - ( 07 Jan 2021 00:25 )  PTT:28.9 sec          Culture - Blood (collected 07 Jan 2021 00:25)  Source: .Blood Blood-Peripheral  Preliminary Report (08 Jan 2021 07:02):    No growth to date.    Culture - Blood (collected 07 Jan 2021 00:25)  Source: .Blood Blood-Peripheral  Preliminary Report (08 Jan 2021 07:02):    No growth to date.    [id number]        <<<<<RADIOLOGY>>>>>    ASSESSMENT/PLAN  `61yo male with pmhx of PE on eliquis 5 years ago secondary to factor 5 leiden, HLD, depression, congenital solitary kidney, vasovagal syncope presented from [facility] for hypoxia.    #Acute hypoxemic respiratory failure secondary to COVID with possible superimposed chemical pneumonitis  #Transaminitis likely secondary to covid  - day 14 since initial symptoms  - CXR (1/7): increased LLL opacity and R middle lobe opacities  - LE duplex negative  - WBC increased 11>16 > 14  Inflamm Markers  lactate 1.2  DDimer 250    Ferritin 1276 > 1163  CRP 1.65  Procalc .18 > .06 > .05  - repeat inflammatory markers Q48H if worsening respiratory status  - ID consulted, continue dexa 6 mg IV daily and ordered toci x 1 dose  - still suspected aspiration pna, continue unasyn   - continue lovenox for VTE prophlyaxis  - Tylenol and Robitussin PRN  - Keep Sat > 94%, currently on High flow NC 60L 90%    #Pulmonary embolus secondary to factor V leiden  - hx of factor V leiden according to patient though no records in [facility] --> pt follows up at [facility]  - cont eliquis 5mg BID     #HLD - c/w atorvastatin  #Depression- cont welbutrin 300mg daily and lexapro 10mg daily  #Hx Solitary kidney - Creatinine stable     DVT ppx: eliquis  GI ppx: pantoprazole  Diet: DASH/TLC   Code status: FULL CODE  Dispo: from home; independent ADLs; acute 
----------Daily Progress Note----------    HISTORY OF PRESENT ILLNESS:  Patient is a 60y old Male who presents with a chief complaint of hypoxia/covid (09 Jan 2021 16:11)    Currently admitted to medicine with the primary diagnosis of Hypoxia       Today is hospital day 3d.     INTERVAL HOSPITAL COURSE / OVERNIGHT EVENTS:    Patient was examined and seen at bedside. This morning he is resting comfortably in bed and reports no new issues or overnight events.     Review of Systems: Otherwise unremarkable     <<<<<PAST MEDICAL & SURGICAL HISTORY>>>>>  Solitary kidney    Depression    Hyperlipidemia    PE (pulmonary thromboembolism)    No pertinent past surgical history      ALLERGIES  No Known Allergies    MEDICATIONS  STANDING MEDICATIONS  apixaban 5 milliGRAM(s) Oral every 12 hours  atorvastatin Oral Tab/Cap - Peds 40 milliGRAM(s) Oral daily  buPROPion XL . 300 milliGRAM(s) Oral daily  chlorhexidine 4% Liquid 1 Application(s) Topical <User Schedule>  dexAMETHasone  Injectable 6 milliGRAM(s) IV Push daily  escitalopram 10 milliGRAM(s) Oral daily  pantoprazole    Tablet 40 milliGRAM(s) Oral before breakfast  sodium zirconium cyclosilicate 5 Gram(s) Oral two times a day    PRN MEDICATIONS  guaifenesin/dextromethorphan  Syrup 5 milliLiter(s) Oral four times a day PRN    VITALS:  T(F): 97  HR: 85  BP: 107/68  RR: 18  SpO2: 92%    <<<<<PHYSICAL EXAM>>>>>  GENERAL: Well developed, well nourished and in no acute distress. Resting comfortably in bed.  PULMONARY: Clear to auscultation bilaterally. No rales, rhonchi, or wheezing.  CARDIOVASCULAR: Regular rate and rhythm, S1-S2, no murmurs  GASTROINTESTINAL: Soft, non-tender, non-distended, no guarding.  RENAL: No CVA tenderness.  SKIN/EXTREMITIES: No clubbing or edema  NEUROLOGIC/MUSCULOSKELETAL: AOx4, grossly moving all extremities, no focal deficits.    <<<<<LABS>>>>>                        14.8   15.47 )-----------( 405      ( 10 José Miguel 2021 04:30 )             43.9     01-10    140  |  101  |  33<H>  ----------------------------<  121<H>  4.7   |  26  |  1.1    Ca    8.5      10 Jan 2021 04:30  Mg     2.9     01-10    TPro  6.0  /  Alb  3.6  /  TBili  0.8  /  DBili  0.2  /  AST  26  /  ALT  52<H>  /  AlkPhos  63  01-10            [id number]        <<<<<RADIOLOGY>>>>>    ASSESSMENT/PLAN  `59yo male with pmhx of PE on eliquis 5 years ago secondary to factor 5 leiden, HLD, depression, congenital solitary kidney, vasovagal syncope presented from [facility] for hypoxia.    #Acute hypoxemic respiratory failure secondary likely due to SARS-CoV2  #Transaminitis likely secondary to covid  - day 14 since initial symptoms  - CXR (1/7): increased LLL opacity and R middle lobe opacities  - LE duplex negative  - WBC increased 11>16 > 14  Inflamm Markers  lactate 1.2  DDimer 250 > 590    Ferritin 1276 > 1163  CRP 1.65 > 0.76  Procalc .18 > .06 > .05  - repeat inflammatory markers Q48H if worsening respiratory status  - ID consulted, continue dexa 6 mg IV daily  - s/p toci x 1 dose  - d/c unasyn due to unlikely superimposed aspriation pna  - continue lovenox for VTE prophlyaxis  - Tylenol and Robitussin PRN  - Keep Sat > 94%, currently on High flow NC 60L 70%    #Pulmonary embolus secondary to factor V leiden  - hx of factor V leiden according to patient though no records in [facility] --> pt follows up at [facility]  - cont eliquis 5mg BID     #HLD - c/w atorvastatin  #Depression- cont welbutrin 300mg daily and lexapro 10mg daily  #Hx Solitary kidney - Creatinine stable     DVT ppx: eliquis  GI ppx: pantoprazole  Diet: DASH/TLC   Code status: FULL CODE  Dispo: from home; independent ADLs; acute   
----------Daily Progress Note----------    HISTORY OF PRESENT ILLNESS:  Patient is a 60y old Male who presents with a chief complaint of hypoxia/covid (10 José Miguel 2021 09:11)    Currently admitted to medicine with the primary diagnosis of Hypoxia       Today is hospital day 4d.     INTERVAL HOSPITAL COURSE / OVERNIGHT EVENTS:    Patient was examined and seen at bedside. This morning he is resting comfortably in bed and reports no new issues or overnight events.     Review of Systems: Otherwise unremarkable     <<<<<PAST MEDICAL & SURGICAL HISTORY>>>>>  Solitary kidney    Depression    Hyperlipidemia    PE (pulmonary thromboembolism)    No pertinent past surgical history      ALLERGIES  No Known Allergies    MEDICATIONS  STANDING MEDICATIONS  apixaban 5 milliGRAM(s) Oral every 12 hours  atorvastatin Oral Tab/Cap - Peds 40 milliGRAM(s) Oral daily  buPROPion XL . 300 milliGRAM(s) Oral daily  chlorhexidine 4% Liquid 1 Application(s) Topical <User Schedule>  escitalopram 10 milliGRAM(s) Oral daily  pantoprazole    Tablet 40 milliGRAM(s) Oral before breakfast  sodium zirconium cyclosilicate 5 Gram(s) Oral two times a day    PRN MEDICATIONS  guaifenesin/dextromethorphan  Syrup 5 milliLiter(s) Oral four times a day PRN    VITALS:  T(F): 97.2  HR: 79  BP: 110/71  RR: 18  SpO2: 97%    <<<<<LABS>>>>>                        14.5   15.32 )-----------( 369      ( 11 Jan 2021 04:30 )             43.4     01-11    140  |  103  |  43<H>  ----------------------------<  181<H>  4.2   |  21  |  1.1    Ca    7.9<L>      11 Jan 2021 04:30  Mg     2.5     01-11    TPro  5.8<L>  /  Alb  3.1<L>  /  TBili  1.0  /  DBili  0.2  /  AST  27  /  ALT  43<H>  /  AlkPhos  56  01-11            [id number]        <<<<<RADIOLOGY>>>>>    <<<<<PHYSICAL EXAM>>>>>  GENERAL: Well developed, well nourished and in no acute distress. Resting comfortably in bed.  HEENT: Normocephalic, atraumatic, mucous membranes moist, EOMI, PERRLA, bilateral sclera anicteric, no conjunctival injection  Neck: Supple, non-tender, no lymphadenopathy.  PULMONARY: Clear to auscultation bilaterally. No rales, rhonchi, or wheezing.  CARDIOVASCULAR: Regular rate and rhythm, S1-S2, no murmurs  GASTROINTESTINAL: Soft, non-tender, non-distended, no guarding.  RENAL: No CVA tenderness.  SKIN/EXTREMITIES: No clubbing or edema  NEUROLOGIC/MUSCULOSKELETAL: AOx4, grossly moving all extremities, no focal deficits.    ---------------------------------------------------------------------------------------------------------------------------------------------------------------------------------------------------  ASSESSMENT/PLAN  59yo male with pmhx of PE on eliquis 5 years ago secondary to factor 5 leiden, HLD, depression, congenital solitary kidney, vasovagal syncope presented from [facility] for hypoxia.    #Acute hypoxemic respiratory failure likely secondary to COVID PNA  #Transaminitis likely secondary to covid  - day 14 since initial symptoms  - CXR (1/7): increased LLL opacity and R middle lobe opacities  - LE duplex negative  - WBC increased 11>16 >14 >15  Inflamm Markers  lactate 1.2  DDimer 250 > 590    Ferritin 1276 > 1163  CRP 1.65 > 0.76  Procalc .18 > .06 > .05  - no need to trend inflammatory markers   - ID consulted, continue dexa 6 mg IV daily  - s/p toci x 1 dose  - off abx   - continue lovenox for VTE prophlyaxis  - Tylenol and Robitussin PRN  - Keep Sat > 94%, currently on High flow NC 60L 60%    #Pulmonary embolus secondary to factor V leiden  - hx of factor V leiden according to patient though no records in [facility] --> pt follows up at [facility]  - cont eliquis 5mg BID     #HLD - c/w atorvastatin  #Depression- cont welbutrin 300mg daily and lexapro 10mg daily  #Hx Solitary kidney - Creatinine stable     DVT ppx: eliquis  GI ppx: pantoprazole  Diet: DASH/TLC   Code status: FULL CODE  Dispo: from home; independent ADLs; acute 
----------Daily Progress Note----------    HISTORY OF PRESENT ILLNESS:  Patient is a 60y old Male who presents with a chief complaint of hypoxia/covid (11 Jan 2021 12:28)    Currently admitted to medicine with the primary diagnosis of Hypoxia       Today is hospital day 5d.     INTERVAL HOSPITAL COURSE / OVERNIGHT EVENTS:    Patient was examined and seen at bedside. This morning he is resting comfortably in bed and reports no new issues or overnight events.     Review of Systems: Otherwise unremarkable     <<<<<PAST MEDICAL & SURGICAL HISTORY>>>>>  Solitary kidney    Depression    Hyperlipidemia    PE (pulmonary thromboembolism)    No pertinent past surgical history      ALLERGIES  No Known Allergies    MEDICATIONS  STANDING MEDICATIONS  apixaban 5 milliGRAM(s) Oral every 12 hours  atorvastatin Oral Tab/Cap - Peds 40 milliGRAM(s) Oral daily  buPROPion XL . 300 milliGRAM(s) Oral daily  chlorhexidine 4% Liquid 1 Application(s) Topical <User Schedule>  escitalopram 10 milliGRAM(s) Oral daily  pantoprazole    Tablet 40 milliGRAM(s) Oral before breakfast    PRN MEDICATIONS  guaifenesin/dextromethorphan  Syrup 5 milliLiter(s) Oral four times a day PRN    VITALS:  T(F): 97.1  HR: 80  BP: 117/57  RR: 18  SpO2: 95%    <<<<<LABS>>>>>                        14.5   15.32 )-----------( 369      ( 11 Jan 2021 04:30 )             43.4     01-11    140  |  103  |  43<H>  ----------------------------<  181<H>  4.2   |  21  |  1.1    Ca    7.9<L>      11 Jan 2021 04:30  Mg     2.5     01-11    TPro  5.8<L>  /  Alb  3.1<L>  /  TBili  1.0  /  DBili  0.2  /  AST  27  /  ALT  43<H>  /  AlkPhos  56  01-11            [id number]        <<<<<RADIOLOGY>>>>>    <<<<<PHYSICAL EXAM>>>>>  GENERAL: Well developed, well nourished and in no acute distress. Resting comfortably in bed.  HEENT: Normocephalic, atraumatic, mucous membranes moist, EOMI, PERRLA, bilateral sclera anicteric, no conjunctival injection  Neck: Supple, non-tender, no lymphadenopathy.  PULMONARY: Clear to auscultation bilaterally. No rales, rhonchi, or wheezing.  CARDIOVASCULAR: Regular rate and rhythm, S1-S2, no murmurs  GASTROINTESTINAL: Soft, non-tender, non-distended, no guarding.  RENAL: No CVA tenderness.  SKIN/EXTREMITIES: No clubbing or edema  NEUROLOGIC/MUSCULOSKELETAL: AOx4, grossly moving all extremities, no focal deficits.      -----------------------------------------------------------------------------------------------------------------------------------------------------------------------------------------------    ASSESSMENT/PLAN  61yo male with pmhx of PE on eliquis 5 years ago secondary to factor 5 leiden, HLD, depression, congenital solitary kidney, vasovagal syncope presented from [facility] for hypoxia.    #Acute hypoxemic respiratory failure likely secondary to COVID PNA  #Transaminitis likely secondary to covid  - day 14 since initial symptoms  - CXR (1/11): worsening b/l opacities  - LE duplex negative  - WBC increased 11>16 >14 >15  Inflamm Markers  lactate 1.2  DDimer 250 > 590    Ferritin 1276 > 1163 > 1362  CRP 1.65 > 0.76  Procalc .18 > .06 > .05  - no need to trend inflammatory markers   - ID consulted, continue dexa 6 mg IV daily  - s/p toci x 2 dose  - off abx   - continue lovenox for VTE prophlyaxis  - Tylenol and Robitussin PRN  - Keep Sat > 94%, currently on High flow NC 60L 60%    #Pulmonary embolus secondary to factor V leiden  - hx of factor V leiden according to patient though no records in [facility] --> pt follows up at [facility]  - cont eliquis 5mg BID     #HLD - c/w atorvastatin  #Depression- cont welbutrin 300mg daily and lexapro 10mg daily  #Hx Solitary kidney - Creatinine stable     DVT ppx: eliquis  GI ppx: pantoprazole  Diet: DASH/TLC   Code status: FULL CODE  Dispo: from home; independent ADLs; acute 
----------Daily Progress Note----------    HISTORY OF PRESENT ILLNESS:  Patient is a 60y old Male who presents with a chief complaint of hypoxia/covid (12 Jan 2021 14:11)    Currently admitted to medicine with the primary diagnosis of Hypoxia       Today is hospital day 6d.     INTERVAL HOSPITAL COURSE / OVERNIGHT EVENTS:    Patient was examined and seen at bedside. This morning he is resting comfortably in bed and reports no new issues or overnight events.     Review of Systems: Otherwise unremarkable     <<<<<PAST MEDICAL & SURGICAL HISTORY>>>>>  Solitary kidney    Depression    Hyperlipidemia    PE (pulmonary thromboembolism)    No pertinent past surgical history      ALLERGIES  No Known Allergies    MEDICATIONS  STANDING MEDICATIONS  apixaban 5 milliGRAM(s) Oral every 12 hours  atorvastatin Oral Tab/Cap - Peds 40 milliGRAM(s) Oral daily  buPROPion XL . 300 milliGRAM(s) Oral daily  chlorhexidine 4% Liquid 1 Application(s) Topical <User Schedule>  dexAMETHasone  Injectable 6 milliGRAM(s) IV Push daily  escitalopram 10 milliGRAM(s) Oral daily  pantoprazole    Tablet 40 milliGRAM(s) Oral before breakfast    PRN MEDICATIONS  guaifenesin/dextromethorphan  Syrup 5 milliLiter(s) Oral four times a day PRN    VITALS:  T(F): 97.1  HR: 83  BP: 106/70  RR: 18  SpO2: 95%    <<<<<PHYSICAL EXAM>>>>>  GENERAL: Well developed, well nourished and in no acute distress. Resting comfortably in bed.  HEENT: Normocephalic, atraumatic, mucous membranes moist, EOMI, PERRLA, bilateral sclera anicteric, no conjunctival injection  Neck: Supple, non-tender, no lymphadenopathy.  PULMONARY: Crackles b/l  CARDIOVASCULAR: Regular rate and rhythm, S1-S2, no murmurs  GASTROINTESTINAL: Soft, non-tender, non-distended, no guarding.  RENAL: No CVA tenderness.  SKIN/EXTREMITIES: No clubbing or edema  NEUROLOGIC/MUSCULOSKELETAL: AOx4, grossly moving all extremities, no focal deficits.      -----------------------------------------------------------------------------------------------------------------------------------------------------------------------------------------------    ASSESSMENT/PLAN  59yo male with pmhx of PE on eliquis 5 years ago secondary to factor 5 leiden, HLD, depression, congenital solitary kidney, vasovagal syncope presented from [facility] for hypoxia.    #Acute hypoxemic respiratory failure likely secondary to COVID PNA  #Transaminitis likely secondary to covid  - day 14 since initial symptoms  - CXR (1/11): worsening b/l opacities  - LE duplex negative  - WBC increased 11>16 >14 >15 > 11  Inflamm Markers  lactate 1.2  DDimer 250 > 590 > 931 >     Ferritin 1276 > 1163 > 1362  CRP 1.65 > 0.76  Procalc .18 > .06 > .05  - no need to trend inflammatory markers   - ID consulted, continue dexa 6 mg IV daily  - s/p toci x 2 dose  - off abx   - continue lovenox for VTE prophlyaxis  - Tylenol and Robitussin PRN  - Keep Sat > 94%, currently on High flow NC 60L 60%    #Pulmonary embolus secondary to factor V leiden  - hx of factor V leiden according to patient though no records in [facility] --> pt follows up at [facility]  - cont eliquis 5mg BID     #HLD - c/w atorvastatin  #Depression- cont welbutrin 300mg daily and lexapro 10mg daily  #Hx Solitary kidney - Creatinine stable     DVT ppx: eliquis  GI ppx: pantoprazole  Diet: DASH/TLC   Code status: FULL CODE  Dispo: from home; independent ADLs; acute     <<<<<LABS>>>>>                        16.3   11.52 )-----------( 238      ( 13 Jan 2021 04:30 )             47.7     01-13    142  |  105  |  29<H>  ----------------------------<  119<H>  4.5   |  26  |  1.1    Ca    8.0<L>      13 Jan 2021 04:30  Mg     2.3     01-13    TPro  5.9<L>  /  Alb  3.2<L>  /  TBili  1.1  /  DBili  x   /  AST  33  /  ALT  45<H>  /  AlkPhos  53  01-13            [id number]        <<<<<RADIOLOGY>>>>>    
----------Daily Progress Note----------    HISTORY OF PRESENT ILLNESS:  Patient is a 60y old Male who presents with a chief complaint of hypoxia/covid (13 Jan 2021 11:28)    Currently admitted to medicine with the primary diagnosis of Hypoxia       Today is hospital day 7d.     INTERVAL HOSPITAL COURSE / OVERNIGHT EVENTS:    Patient was examined and seen at bedside. This morning he is resting comfortably in bed and reports no new issues or overnight events.     Review of Systems: Otherwise unremarkable     <<<<<PAST MEDICAL & SURGICAL HISTORY>>>>>  Solitary kidney    Depression    Hyperlipidemia    PE (pulmonary thromboembolism)    No pertinent past surgical history      ALLERGIES  No Known Allergies    MEDICATIONS  STANDING MEDICATIONS  apixaban 5 milliGRAM(s) Oral every 12 hours  atorvastatin Oral Tab/Cap - Peds 40 milliGRAM(s) Oral daily  buPROPion XL . 300 milliGRAM(s) Oral daily  chlorhexidine 4% Liquid 1 Application(s) Topical <User Schedule>  dexAMETHasone  Injectable 6 milliGRAM(s) IV Push daily  escitalopram 10 milliGRAM(s) Oral daily  multivitamin 1 Tablet(s) Oral daily  pantoprazole    Tablet 40 milliGRAM(s) Oral before breakfast    PRN MEDICATIONS  guaifenesin/dextromethorphan  Syrup 5 milliLiter(s) Oral four times a day PRN    VITALS:  T(F): 96.9  HR: 85  BP: 114/71  RR: 18  SpO2: 97%    <<<<<LABS>>>>>                        14.7   14.50 )-----------( 211      ( 14 Jan 2021 04:30 )             44.5     01-14    139  |  107  |  23<H>  ----------------------------<  162<H>  4.1   |  22  |  0.9    Ca    7.9<L>      14 Jan 2021 04:30  Mg     2.3     01-14    TPro  5.9<L>  /  Alb  3.2<L>  /  TBili  1.1  /  DBili  x   /  AST  33  /  ALT  45<H>  /  AlkPhos  53  01-13            [id number]        <<<<<RADIOLOGY>>>>>    <<<<<PHYSICAL EXAM>>>>>  GENERAL: Well developed, well nourished and in no acute distress. Resting comfortably in bed.  HEENT: Normocephalic, atraumatic, mucous membranes moist, EOMI, PERRLA, bilateral sclera anicteric, no conjunctival injection  Neck: Supple, non-tender, no lymphadenopathy.  PULMONARY: Clear to auscultation bilaterally. No rales, rhonchi, or wheezing.  CARDIOVASCULAR: Regular rate and rhythm, S1-S2, no murmurs  GASTROINTESTINAL: Soft, non-tender, non-distended, no guarding.  RENAL: No CVA tenderness.  SKIN/EXTREMITIES: No clubbing or edema  NEUROLOGIC/MUSCULOSKELETAL: AOx4, grossly moving all extremities, no focal deficits.      -----------------------------------------------------------------------------------------------------------------------------------------------------------------------------------------------  ASSESSMENT/PLAN  59yo male with pmhx of PE on eliquis 5 years ago secondary to factor 5 leiden, HLD, depression, congenital solitary kidney, vasovagal syncope presented from [facility] for hypoxia.    #Acute hypoxemic respiratory failure likely secondary to COVID PNA  #Transaminitis likely secondary to covid  - day 14 since initial symptoms  - CXR (1/13): b/l opacities, increased on the right  - LE duplex negative  - WBC increased 11>14  Inflamm Markers  lactate 1.2  DDimer 250 > 590 > 931 >    > 413  Ferritin 1276 > 1163 > 1362 > 1274  CRP 1.65 > 0.76 > 0.10  Procalc .18 > .06 > .05  - no need to trend inflammatory markers   - ID consulted, continue dexa 6 mg IV daily  - s/p toci x 2 dose  - off abx   - continue lovenox for VTE prophlyaxis  - Tylenol and Robitussin PRN  - Keep Sat > 94%, currently on High flow NC 60L 50%    #Pulmonary embolus secondary to factor V leiden  - hx of factor V leiden according to patient though no records in [facility] --> pt follows up at [facility]  - cont eliquis 5mg BID     #HLD - c/w atorvastatin  #Depression- cont welbutrin 300mg daily and lexapro 10mg daily  #Hx Solitary kidney - Creatinine stable     DVT ppx: eliquis  GI ppx: pantoprazole  Diet: DASH/TLC   Code status: FULL CODE  Dispo: from home; independent ADLs; acute 
----------Daily Progress Note----------    HISTORY OF PRESENT ILLNESS:  Patient is a 60y old Male who presents with a chief complaint of hypoxia/covid (14 Jan 2021 12:20)    Currently admitted to medicine with the primary diagnosis of Hypoxia       Today is hospital day 8d.     INTERVAL HOSPITAL COURSE / OVERNIGHT EVENTS:    Patient was examined and seen at bedside. This morning he is resting comfortably in bed and reports no new issues or overnight events.     Review of Systems: Otherwise unremarkable     <<<<<PAST MEDICAL & SURGICAL HISTORY>>>>>  Solitary kidney    Depression    Hyperlipidemia    PE (pulmonary thromboembolism)    No pertinent past surgical history      ALLERGIES  No Known Allergies    MEDICATIONS  STANDING MEDICATIONS  apixaban 5 milliGRAM(s) Oral every 12 hours  atorvastatin Oral Tab/Cap - Peds 40 milliGRAM(s) Oral daily  buPROPion XL . 300 milliGRAM(s) Oral daily  chlorhexidine 4% Liquid 1 Application(s) Topical <User Schedule>  dexAMETHasone  Injectable 6 milliGRAM(s) IV Push daily  escitalopram 10 milliGRAM(s) Oral daily  multivitamin 1 Tablet(s) Oral daily  pantoprazole    Tablet 40 milliGRAM(s) Oral before breakfast    PRN MEDICATIONS  guaifenesin/dextromethorphan  Syrup 5 milliLiter(s) Oral four times a day PRN    VITALS:  T(F): 97.6  HR: 68  BP: 111/73  RR: 18  SpO2: 95%    <<<<<LABS>>>>>                        15.0   14.10 )-----------( 192      ( 15 José Miguel 2021 08:25 )             44.3     01-15    141  |  107  |  24<H>  ----------------------------<  136<H>  4.3   |  24  |  0.9    Ca    8.1<L>      15 José Miguel 2021 08:25  Mg     2.2     01-15    TPro  5.8<L>  /  Alb  3.8  /  TBili  1.1  /  DBili  x   /  AST  36  /  ALT  61<H>  /  AlkPhos  44  01-15            [id number]        <<<<<RADIOLOGY>>>>>    <<<<<PHYSICAL EXAM>>>>>  GENERAL: Well developed, well nourished and in no acute distress. Resting comfortably in bed.  HEENT: Normocephalic, atraumatic, mucous membranes moist, EOMI, PERRLA, bilateral sclera anicteric, no conjunctival injection  Neck: Supple, non-tender, no lymphadenopathy.  PULMONARY: Clear to auscultation bilaterally. No rales, rhonchi, or wheezing.  CARDIOVASCULAR: Regular rate and rhythm, S1-S2, no murmurs  GASTROINTESTINAL: Soft, non-tender, non-distended, no guarding.  RENAL: No CVA tenderness.  SKIN/EXTREMITIES: No clubbing or edema  NEUROLOGIC/MUSCULOSKELETAL: AOx4, grossly moving all extremities, no focal deficits.      -----------------------------------------------------------------------------------------------------------------------------------------------------------------------------------------------  ASSESSMENT/PLAN  61yo male with pmhx of PE on eliquis 5 years ago secondary to factor 5 leiden, HLD, depression, congenital solitary kidney, vasovagal syncope presented from [facility] for hypoxia.    #Acute hypoxemic respiratory failure likely secondary to COVID PNA  #Transaminitis likely secondary to covid  - day 14 since initial symptoms  - CXR (1/13): b/l opacities, increased on the right  - LE duplex negative  - WBC increased 11>14  Inflamm Markers  lactate 1.2  DDimer 250 > 590 > 931 >    > 413  Ferritin 1276 > 1163 > 1362 > 1274  CRP 1.65 > 0.76 > 0.10  Procalc .18 > .06 > .05  - no need to trend inflammatory markers   - ID consulted, continue dexa 6 mg IV daily  - s/p toci x 2 dose  - off abx   - continue lovenox for VTE prophlyaxis  - Tylenol and Robitussin PRN  - Keep Sat > 94%, currently on High flow NC 50L 50%    #Pulmonary embolus secondary to factor V leiden  - hx of factor V leiden according to patient though no records in [facility] --> pt follows up at [facility]  - cont eliquis 5mg BID     #HLD - c/w atorvastatin  #Depression- cont welbutrin 300mg daily and lexapro 10mg daily  #Hx Solitary kidney - Creatinine stable     DVT ppx: eliquis  GI ppx: pantoprazole  Diet: DASH/TLC   Code status: FULL CODE  Dispo: from home; independent ADLs; acute 
HPI  Patient is a 60y old Male who presents with a chief complaint of hypoxia/covid (18 Jan 2021 08:35)    Currently admitted to medicine with the primary diagnosis of Hypoxia       Today is hospital day 12d.     INTERVAL HPI / OVERNIGHT EVENTS:  Patient was examined and seen at bedside. This morning he is resting comfortably in bed and reports no new issues or overnight events. He reports his breathing is very much stable.      ROS: Otherwise unremarkable     PAST MEDICAL & SURGICAL HISTORY  Solitary kidney    Depression    Hyperlipidemia    PE (pulmonary thromboembolism)    No pertinent past surgical history      ALLERGIES  No Known Allergies    MEDICATIONS  STANDING MEDICATIONS  apixaban 5 milliGRAM(s) Oral every 12 hours  atorvastatin Oral Tab/Cap - Peds 40 milliGRAM(s) Oral daily  buPROPion XL . 300 milliGRAM(s) Oral daily  chlorhexidine 4% Liquid 1 Application(s) Topical <User Schedule>  dexAMETHasone  Injectable 6 milliGRAM(s) IV Push daily  escitalopram 10 milliGRAM(s) Oral daily  multivitamin 1 Tablet(s) Oral daily  pantoprazole    Tablet 40 milliGRAM(s) Oral before breakfast    PRN MEDICATIONS  guaifenesin/dextromethorphan  Syrup 5 milliLiter(s) Oral four times a day PRN    VITALS:  T(F): 96.9  HR: 89  BP: 124/72  RR: 18  SpO2: 96%    PHYSICAL EXAM  GEN: NAD, Resting comfortably in bed  PULM: Clear to auscultation bilaterally, No wheezes, currently on HF  CVS: Regular rate and rhythm, S1-S2, no murmurs  ABD: Soft, non-tender, non-distended, no guarding  EXT: No edema  NEURO: A&Ox3, no focal deficits    LABS                        13.6   14.13 )-----------( 126      ( 18 Jan 2021 04:30 )             40.5     01-18    140  |  103  |  24<H>  ----------------------------<  182<H>  4.2   |  26  |  0.9    Ca    8.5      18 Jan 2021 04:30  Mg     2.2     01-18    TPro  5.3<L>  /  Alb  3.3<L>  /  TBili  0.7  /  DBili  x   /  AST  17  /  ALT  47<H>  /  AlkPhos  41  01-18                  
ROSALVA DEAN  60y, Male  Allergy: No Known Allergies      CHIEF COMPLAINT: hypoxia/covid (07 Jan 2021 01:32)      LOS      HPI:  `61yo male with pmhx of PE on eliquis 5 years ago secondary to factor 5 leiden, HLD, depression, congenital solitary kidney, vasovagal syncope presented from [facility] for hypoxia.  Pt. presented to [facility] on 1-3-21 with SOB, weakness, and fevers 10 days duration, and found covid positive. Initially was satting well on 2 L NC to 94% Chest xray showed b/l opacities. Pt. was seen by ID, was started on steroids and was transferred to [facility] 1-5-21.  Today Pt. reports working with PT and "over doing it" and then while brushing teeth tonight had eisode of coughing/ choking on toothpaste last a few minutes resulting in ppulse ox decreasing to 70s. Pt. was placed on non-rebreather improved to 88-90% and was sent to ED for further eval.  In ED Pt. satting 81% on non-rebreather and placed in High flow NC satting 94%. Pt. reports he has no symptoms and that prior covid related symptoms to have resolved.   Other vitals significant for Александр 100.2, WBC increased 11>16 from two days prior to admission. DDimer 250. Transaminits downtrending. Lactate 1.2. chest xray appears unchanged pending official read.  (07 Jan 2021 01:32)      INFECTIOUS DISEASE HISTORY:  History as above  Admitted to [facility] on 1/3/21.  Had fevers 10 days prior to initial admission.   Transferred to [facility], but worsening hypoxia.   Currently on HFNC.   Initially on 2L NC on initial admission, but has had worsening hypoxia over the past few days, requiring 5L prior to transfer.     PAST MEDICAL & SURGICAL HISTORY:  Solitary kidney  Depression  Hyperlipidemia  PE (pulmonary thromboembolism)  No pertinent past surgical history        FAMILY HISTORY  Family history of hyperlipidemia    Family history of hyperlipidemia    Family history of cardiac disorder in father    No pertinent family history in first degree relatives        SOCIAL HISTORY  Social History:  denies smoking and Etoh use (07 Jan 2021 01:32)        ROS  General: Denies rigors, nightsweats  HEENT: Denies headache, rhinorrhea, sore throat, eye pain  CV: Denies CP, palpitations  PULM: Denies wheezing, hemoptysis  GI: Denies hematemesis, hematochezia, melena  : Denies discharge, hematuria  MSK: Denies arthralgias, myalgias  SKIN: Denies rash, lesions  NEURO: Denies paresthesias, weakness  PSYCH: Denies depression, anxiety    VITALS:  T(F): 97.3, Max: 100.2 (01-06-21 @ 23:08)  HR: 85  BP: 140/77  RR: 18Vital Signs Last 24 Hrs  T(C): 36.3 (07 Jan 2021 04:44), Max: 37.9 (06 Jan 2021 23:08)  T(F): 97.3 (07 Jan 2021 04:44), Max: 100.2 (06 Jan 2021 23:08)  HR: 85 (07 Jan 2021 05:47) (77 - 93)  BP: 140/77 (07 Jan 2021 04:44) (112/64 - 140/77)  BP(mean): --  RR: 18 (07 Jan 2021 04:44) (18 - 22)  SpO2: 96% (07 Jan 2021 05:47) (74% - 97%)    PHYSICAL EXAM:  Gen: NAD, resting in bed  HEENT: Normocephalic, atraumatic  Neck: supple, no lymphadenopathy  CV: Regular rate & regular rhythm  Lungs: decreased BS at bases, no fremitus  Abdomen: Soft, BS present  Ext: Warm, well perfused  Neuro: non focal, awake  Skin: no rash, no erythema  Lines: no phlebitis    TESTS & MEASUREMENTS:                        13.4   16.81 )-----------( 365      ( 07 Jan 2021 00:25 )             40.1     01-07    136  |  100  |  25<H>  ----------------------------<  182<H>  5.0   |  27  |  1.0    Ca    8.3<L>      07 Jan 2021 00:25    TPro  6.0  /  Alb  3.6  /  TBili  0.4  /  DBili  x   /  AST  49<H>  /  ALT  77<H>  /  AlkPhos  78  01-07    eGFR if Non African American: 81 mL/min/1.73M2 (01-07-21 @ 00:25)  eGFR if : 94 mL/min/1.73M2 (01-07-21 @ 00:25)    LIVER FUNCTIONS - ( 07 Jan 2021 00:25 )  Alb: 3.6 g/dL / Pro: 6.0 g/dL / ALK PHOS: 78 U/L / ALT: 77 U/L / AST: 49 U/L / GGT: x                 Blood Gas Venous - Lactate: 1.2 mmoL/L (01-07-21 @ 01:18)  Blood Gas Venous - Lactate: 1.5 mmoL/L (01-03-21 @ 21:13)      INFECTIOUS DISEASES TESTING  Procalcitonin, Serum: 0.18 ng/mL (01-03-21 @ 21:09)  COVID-19 PCR: Detected (01-02-21 @ 08:00)      RADIOLOGY & ADDITIONAL TESTS:  I have personally reviewed the last Chest xray  CXR      CT      CARDIOLOGY TESTING  12 Lead ECG:   Ventricular Rate 110 BPM    Atrial Rate 110 BPM    P-R Interval 132 ms    QRS Duration 162 ms    Q-T Interval 384 ms    QTC Calculation(Bazett) 519 ms    P Axis 54 degrees    R Axis -65 degrees    T Axis 109 degrees    Diagnosis Line Sinus tachycardia with  Blocked Premature atrial complexes  Atrial-sensed ventricular-paced rhythm with occasional atrial-paced complexes  Abnormal ECG    Confirmed by Syd Corbin ([id number]) on 1/7/2021 7:04:00 AM (01-07-21 @ 01:16)  12 Lead ECG:   Ventricular Rate 100 BPM    Atrial Rate 100 BPM    P-R Interval 158 ms    QRS Duration 80 ms    Q-T Interval 340 ms    QTC Calculation(Bazett) 438 ms    P Axis 30 degrees    R Axis 98 degrees    T Axis 13 degrees    Diagnosis Line Normal sinus rhythm  Rightward axis  Poor R wave progression  Possible Inferior infarct , age undetermined  Abnormal ECG    Confirmed by Ro Gambino MD ([id number]) on 1/4/2021 8:02:42 AM (01-03-21 @ 21:08)      MEDICATIONS  apixaban 5 Oral every 12 hours  atorvastatin Oral Tab/Cap - Peds 40 Oral daily  buPROPion XL . 300 Oral daily  chlorhexidine 4% Liquid 1 Topical <User Schedule>  dexAMETHasone  Injectable 6 IV Push daily  escitalopram 10 Oral daily  pantoprazole    Tablet 40 Oral before breakfast      Weight  Weight (kg): 91 (01-07-21 @ 04:44)    ANTIBIOTICS:      ALLERGIES:  No Known Allergies  
HPI  Patient is a 60y old Male who presents with a chief complaint of hypoxia/covid (17 Jan 2021 15:28)    Currently admitted to medicine with the primary diagnosis of Hypoxia       Today is hospital day 11d.     INTERVAL HPI / OVERNIGHT EVENTS:  Patient was examined and seen at bedside. This morning he is resting comfortably in bed and reports no new issues or overnight events. He reports that his breathing has been stable.     ROS: Otherwise unremarkable     PAST MEDICAL & SURGICAL HISTORY  Solitary kidney    Depression    Hyperlipidemia    PE (pulmonary thromboembolism)    No pertinent past surgical history      ALLERGIES  No Known Allergies    MEDICATIONS  STANDING MEDICATIONS  apixaban 5 milliGRAM(s) Oral every 12 hours  atorvastatin Oral Tab/Cap - Peds 40 milliGRAM(s) Oral daily  buPROPion XL . 300 milliGRAM(s) Oral daily  chlorhexidine 4% Liquid 1 Application(s) Topical <User Schedule>  escitalopram 10 milliGRAM(s) Oral daily  multivitamin 1 Tablet(s) Oral daily  pantoprazole    Tablet 40 milliGRAM(s) Oral before breakfast    PRN MEDICATIONS  guaifenesin/dextromethorphan  Syrup 5 milliLiter(s) Oral four times a day PRN    VITALS:  T(F): 96.5  HR: 75  BP: 124/65  RR: 17  SpO2: 95%    PHYSICAL EXAM  GEN: NAD, Resting comfortably in bed  PULM: Decreased breath sounds bilaterally  CVS: Regular rate and rhythm, S1-S2, no murmurs  ABD: Soft, non-tender, non-distended, no guarding  EXT: No edema  NEURO: A&Ox3, no focal deficits    LABS                        13.6   14.13 )-----------( 126      ( 18 Jan 2021 04:30 )             40.5     01-18    140  |  103  |  24<H>  ----------------------------<  182<H>  4.2   |  26  |  0.9    Ca    8.5      18 Jan 2021 04:30  Mg     2.2     01-18    TPro  5.3<L>  /  Alb  3.3<L>  /  TBili  0.7  /  DBili  x   /  AST  17  /  ALT  47<H>  /  AlkPhos  41  01-18            
S: No new events/complaints  Eating, walking , drinking ok      All other pertinent ROS negative.      01-17-21 @ 07:01  -  01-17-21 @ 15:29  --------------------------------------------------------  IN: 0 mL / OUT: 400 mL / NET: -400 mL      Vital Signs Last 24 Hrs  T(C): 36.4 (17 Jan 2021 13:20), Max: 36.4 (17 Jan 2021 13:20)  T(F): 97.6 (17 Jan 2021 13:20), Max: 97.6 (17 Jan 2021 13:20)  HR: 89 (17 Jan 2021 13:20) (81 - 97)  BP: 131/79 (17 Jan 2021 13:20) (114/71 - 131/79)  BP(mean): --  RR: 18 (17 Jan 2021 13:20) (18 - 18)  SpO2: 95% (17 Jan 2021 13:20) (94% - 97%)  PHYSICAL EXAM:    Constitutional: NAD, awake and alert, well-developed  HEENT: PERR, EOMI, Normal Hearing, MMM  Neck: Soft and supple, No LAD, No JVD  Respiratory: Breath sounds are clear bilaterally, No wheezing, rales or rhonchi  Cardiovascular: S1 and S2, regular rate and rhythm, no Murmurs, gallops or rubs  Gastrointestinal: Bowel Sounds present, soft, nontender, nondistended, no guarding, no rebound  Extremities: No peripheral edema      MEDICATIONS:  MEDICATIONS  (STANDING):  apixaban 5 milliGRAM(s) Oral every 12 hours  atorvastatin Oral Tab/Cap - Peds 40 milliGRAM(s) Oral daily  buPROPion XL . 300 milliGRAM(s) Oral daily  chlorhexidine 4% Liquid 1 Application(s) Topical <User Schedule>  dexAMETHasone  Injectable 6 milliGRAM(s) IV Push daily  escitalopram 10 milliGRAM(s) Oral daily  multivitamin 1 Tablet(s) Oral daily  pantoprazole    Tablet 40 milliGRAM(s) Oral before breakfast      LABS: All Labs Reviewed:                        13.6   17.09 )-----------( 149      ( 17 Jan 2021 04:30 )             40.9     01-17    138  |  106  |  23<H>  ----------------------------<  122<H>  3.9   |  24  |  0.8    Ca    8.3<L>      17 Jan 2021 04:30  Mg     2.0     01-17    TPro  5.2<L>  /  Alb  3.3<L>  /  TBili  0.4  /  DBili  x   /  AST  20  /  ALT  53<H>  /  AlkPhos  44  01-17          Blood Culture:     Radiology: reviewed    
S: no new events/complaints  comfortable on HFNC  walking, eating , sleeping ok.      All other pertinent ROS negative.      Vital Signs Last 24 Hrs  T(C): 36 (16 Jan 2021 13:12), Max: 36 (16 Jan 2021 13:12)  T(F): 96.8 (16 Jan 2021 13:12), Max: 96.8 (16 Jan 2021 13:12)  HR: 92 (16 Jan 2021 13:12) (83 - 108)  BP: 119/72 (16 Jan 2021 13:12) (108/64 - 119/72)  BP(mean): --  RR: 18 (16 Jan 2021 13:12) (18 - 18)  SpO2: 96% (16 Jan 2021 13:12) (95% - 97%)  PHYSICAL EXAM:    Constitutional: NAD, awake and alert, well-developed  HEENT: PERR, EOMI, Normal Hearing, MMM  Neck: Soft and supple, No LAD, No JVD  Respiratory: Breath sounds are clear bilaterally, No wheezing, rales or rhonchi  Cardiovascular: S1 and S2, regular rate and rhythm, no Murmurs, gallops or rubs  Gastrointestinal: Bowel Sounds present, soft, nontender, nondistended, no guarding, no rebound  Extremities: No peripheral edema      MEDICATIONS:  MEDICATIONS  (STANDING):  apixaban 5 milliGRAM(s) Oral every 12 hours  atorvastatin Oral Tab/Cap - Peds 40 milliGRAM(s) Oral daily  buPROPion XL . 300 milliGRAM(s) Oral daily  chlorhexidine 4% Liquid 1 Application(s) Topical <User Schedule>  dexAMETHasone  Injectable 6 milliGRAM(s) IV Push daily  escitalopram 10 milliGRAM(s) Oral daily  multivitamin 1 Tablet(s) Oral daily  pantoprazole    Tablet 40 milliGRAM(s) Oral before breakfast      LABS: All Labs Reviewed:                        14.2   17.15 )-----------( 170      ( 16 Jan 2021 04:30 )             42.2     01-16    138  |  106  |  22<H>  ----------------------------<  294<H>  4.5   |  22  |  0.9    Ca    8.1<L>      16 Jan 2021 04:30  Mg     2.0     01-16    TPro  5.5<L>  /  Alb  3.4<L>  /  TBili  0.5  /  DBili  x   /  AST  32  /  ALT  65<H>  /  AlkPhos  52  01-16          Blood Culture:     Radiology: reviewed    
ADELINAROSALVA ESPARZA  60y, Male  Allergy: No Known Allergies      LOS  5d    CHIEF COMPLAINT: hypoxia/covid (12 Jan 2021 11:16)      INTERVAL EVENTS/HPI  - No acute events overnight  - remains on HFNC; Toci given last night for worsenign infiltrates  - T(F): , Max: 98.7 (01-12-21 @ 12:35)  - Denies any worsening symptoms  - Tolerating medication  - WBC Count: 15.32 (01-11-21 @ 04:30)  WBC Count: 15.47 (01-10-21 @ 04:30)     - Creatinine, Serum: 1.1 (01-11-21 @ 04:30)       ROS  General: Denies rigors, nightsweats  HEENT: Denies headache, rhinorrhea, sore throat, eye pain  CV: Denies CP, palpitations  PULM: Denies wheezing, hemoptysis  GI: Denies hematemesis, hematochezia, melena  : Denies discharge, hematuria  MSK: Denies arthralgias, myalgias  SKIN: Denies rash, lesions  NEURO: Denies paresthesias, weakness  PSYCH: Denies depression, anxiety    VITALS:  T(F): 98.7, Max: 98.7 (01-12-21 @ 12:35)  HR: 88  BP: 108/76  RR: 18Vital Signs Last 24 Hrs  T(C): 37.1 (12 Jan 2021 12:35), Max: 37.1 (12 Jan 2021 12:35)  T(F): 98.7 (12 Jan 2021 12:35), Max: 98.7 (12 Jan 2021 12:35)  HR: 88 (12 Jan 2021 12:35) (80 - 90)  BP: 108/76 (12 Jan 2021 12:35) (108/76 - 117/57)  BP(mean): --  RR: 18 (12 Jan 2021 12:35) (18 - 18)  SpO2: 92% (12 Jan 2021 12:35) (91% - 95%)    PHYSICAL EXAM:  Gen: NAD, resting in bed  HEENT: Normocephalic, atraumatic  Neck: supple, no lymphadenopathy  CV: Regular rate & regular rhythm  Lungs: decreased BS at bases, no fremitus  Abdomen: Soft, BS present  Ext: Warm, well perfused  Neuro: non focal, awake  Skin: no rash, no erythema  Lines: no phlebitis    FH: Non-contributory  Social Hx: Non-contributory    TESTS & MEASUREMENTS:                        14.5   15.32 )-----------( 369      ( 11 Jan 2021 04:30 )             43.4     01-11    140  |  103  |  43<H>  ----------------------------<  181<H>  4.2   |  21  |  1.1    Ca    7.9<L>      11 Jan 2021 04:30  Mg     2.5     01-11    TPro  5.8<L>  /  Alb  3.1<L>  /  TBili  1.0  /  DBili  0.2  /  AST  27  /  ALT  43<H>  /  AlkPhos  56  01-11      LIVER FUNCTIONS - ( 11 Jan 2021 04:30 )  Alb: 3.1 g/dL / Pro: 5.8 g/dL / ALK PHOS: 56 U/L / ALT: 43 U/L / AST: 27 U/L / GGT: x               Culture - Blood (collected 01-07-21 @ 00:25)  Source: .Blood Blood-Peripheral  Final Report (01-12-21 @ 07:00):    No Growth Final    Culture - Blood (collected 01-07-21 @ 00:25)  Source: .Blood Blood-Peripheral  Final Report (01-12-21 @ 07:00):    No Growth Final            INFECTIOUS DISEASES TESTING  Procalcitonin, Serum: 0.05 (01-07-21 @ 08:14)  Procalcitonin, Serum: 0.06 (01-07-21 @ 00:25)  Procalcitonin, Serum: 0.18 (01-03-21 @ 21:09)  COVID-19 PCR: Detected (01-02-21 @ 08:00)      INFLAMMATORY MARKERS  C-Reactive Protein, Serum: 0.76 mg/dL (01-09-21 @ 08:25)  Sedimentation Rate, Erythrocyte: 70 mm/Hr (01-07-21 @ 08:14)  C-Reactive Protein, Serum: 1.65 mg/dL (01-07-21 @ 00:25)  C-Reactive Protein, Serum: 5.31 mg/dL (01-03-21 @ 21:09)      RADIOLOGY & ADDITIONAL TESTS:  I have personally reviewed the last available Chest xray  CXR  Xray Chest 1 View- PORTABLE-Urgent:   EXAM:  XR CHEST PORTABLE URGENT 1V            PROCEDURE DATE:  01/11/2021            INTERPRETATION:  Clinical History / Reason for exam: Viral pneumonia.    Comparison : Chest radiograph January 4, 2021.    Technique/Positioning: Frontal portable.    Findings:    Support devices: None.    Cardiac/mediastinum/hilum: Unremarkable.    Lung parenchyma/Pleura: Bilateral opacities    Skeleton/soft tissues: Unchanged    Impression:    Bilateral opacifications consistent with viral pneumonia, worsening.                  COMFORT BRANDT MD; Attending Interventional Radiologist  This document has been electronically signed. Jan 11 2021  5:02PM (01-11-21 @ 15:58)      CT      CARDIOLOGY TESTING  12 Lead ECG:   Ventricular Rate 110 BPM    Atrial Rate 110 BPM    P-R Interval 132 ms    QRS Duration 162 ms    Q-T Interval 384 ms    QTC Calculation(Bazett) 519 ms    P Axis 54 degrees    R Axis -65 degrees    T Axis 109 degrees    Diagnosis Line Sinus tachycardia with  Blocked Premature atrial complexes  Atrial-sensed ventricular-paced rhythm with occasional atrial-paced complexes  Abnormal ECG    Confirmed by Syd Corbin ([id number]) on 1/7/2021 7:04:00 AM (01-07-21 @ 01:16)  12 Lead ECG:   Ventricular Rate 100 BPM    Atrial Rate 100 BPM    P-R Interval 158 ms    QRS Duration 80 ms    Q-T Interval 340 ms    QTC Calculation(Bazett) 438 ms    P Axis 30 degrees    R Axis 98 degrees    T Axis 13 degrees    Diagnosis Line Normal sinus rhythm  Rightward axis  Poor R wave progression  Possible Inferior infarct , age undetermined  Abnormal ECG    Confirmed by Ro Gambino MD ([id number]) on 1/4/2021 8:02:42 AM (01-03-21 @ 21:08)      MEDICATIONS  apixaban 5 Oral every 12 hours  atorvastatin Oral Tab/Cap - Peds 40 Oral daily  buPROPion XL . 300 Oral daily  chlorhexidine 4% Liquid 1 Topical <User Schedule>  escitalopram 10 Oral daily  pantoprazole    Tablet 40 Oral before breakfast      WEIGHT  Weight (kg): 91 (01-07-21 @ 04:44)      ANTIBIOTICS:      All available historical records have been reviewed

## 2021-01-20 NOTE — PROGRESS NOTE ADULT - NSHPATTENDINGPLANDISCUSS_GEN_ALL_CORE
house staff
residents, nursing, , patient
nurse, CM
residents, nursing, , patient
residents, nursing, , patient
house staff

## 2021-01-20 NOTE — DISCHARGE NOTE NURSING/CASE MANAGEMENT/SOCIAL WORK - NSDCVIVACCINE_GEN_ALL_CORE_FT
CT chest in 3 months, call [phone] to schedule    Continue with pulmicort twice a day  Albuterol as needed    Follow up in 3 months     No Vaccines Administered.

## 2021-01-20 NOTE — PROGRESS NOTE ADULT - ATTENDING COMMENTS
I saw and evaluated patient  by bedside, no active complains, no dyspnea, no cough, on room air during ambulation patient desaturating to 88% improving to 94% on 2 L  , tolerating diet well.    All labs, radiology studies, VS was reviewed  I have reviewed the resident's note and agree with documented findings and  plan of care.  a/p:  Patient is a 61y/o male with pmhx of PE on eliquis 5 years ago secondary to factor 5 leiden, HLD, depression, congenital solitary kidney, vasovagal syncope presented from [facility] for hypoxia.    # Acute hypoxic respiratory failure secondary to COVID severe viral pneumonia with superimposed chemical pneumonitis, less likely bacterial pneumonia  - patient is in late inflammatory phase, covid ab's- positive - no RDV  - CXR 1/11: worsening b/l opacities  - LE duplex negative   - ferritin 1362 > 1274 (1/13)   D-dimer 590 > 931 (1/13)    - patient has completed decadron   10 days course, upon d/c home will give prednisone 20 mg po once daily x 3 more day  - s/p Toci x1 on 1/8 and 1/11 1/13: HFNC 60/60.  1/14: HFNC 60/50. Walking short distances comfortably.  1/15: HFNC 50/50. Walking short distances comfortably.  1/16: HF 50/50. Sat 95%. downtitrate.   1/17: HF 60/40. Sat 96%. downtitrate as able. C/w IV dexa   1/18: HF 60/40.  downtitrate as able. C/w IV dexa   1/19: 6L NC. comfortable. downtitrate as able. May d/c dexa 1/21 or taper off. D/c home w/ home O2 around 1/21?  1/20: patient is off oxygen tx. desaturating to 88%, improved to 94% on 2 L via NC, will need home oxygen tx.     # Transaminitis (secondary to covid) resolved     # Hyperkalemia - improved after Lokelma     # h/o Pulmonary embolus secondary to factor V leiden  - hx of factor V leiden according to patient though no records in [facility] --> pt follows up at [facility]  - cont eliquis 5mg BID     # h/o Dyslipidemia - c/w atorvastatin  # h/o Depression- cont welbutrin 300mg daily and lexapro 10mg daily  # h/o  Solitary kidney - Creatinine stable .     Full Code .     #Progress Note Handoff: Patient was medically optimized, stable for d/c home today if home oxygen tx. is arranged.   Family discussion: Patient verbalized good understanding of discharge instructions and agreed with discharge plan.  Disposition: Home__with home oxygen tx. possibly today.

## 2021-01-20 NOTE — DISCHARGE NOTE PROVIDER - CARE PROVIDER_API CALL
[facility]  [address] [place] [address]  Phone: [phone]  Fax: [phone]  Follow Up Time: 2 weeks

## 2021-01-20 NOTE — DISCHARGE NOTE PROVIDER - HOSPITAL COURSE
61yo male with PMHx of PE on eliquis 5 years ago secondary to factor 5 leiden, HLD, depression, congenital solitary kidney, vasovagal syncope presented from [facility] for hypoxia.  Pt. presented to [facility] on 1-3-21 with SOB, weakness, and fevers 10 days duration, and found covid positive. Initially was satting well on 2 L NC to 94% Chest xray showed b/l opacities. Pt. was seen by ID, was started on steroids and was transferred to [facility] 1-5-21. Pt. reports working with PT and "over doing it" and then while brushing teeth tonight had eisode of coughing/ choking on toothpaste last a few minutes resulting in ppulse ox decreasing to 70s. Pt. was placed on non-rebreather improved to 88-90% and was sent to ED for further eval.  In ED Pt. satting 81% on non-rebreather and placed in High flow NC satting 94%.   Other vitals significant for Александр 100.2, WBC increased 11>16 from two days prior to admission. DDimer 250. Transaminits downtrending. Lactate 1.2. chest xray appears unchanged pending official read.     Patient admitted for Acute hypoxic respiratory failure secondary to COVID severe viral pneumonia with superimposed chemical pneumonitis, less likely bacterial pneumonia. Patient is in late inflammatory phase, COVID Ab positive, therefore not a candidate for RDV. s/p Toci x1 on 1/8 and 1/11 and IV decadron completed 13 days, switch to PO prednisone 20mg for 3 more days. CXR 1/20: decreased B/L opacities. Patient tolerating 4L N/C    Patient was also noted to have Transaminitis (secondary to covid)  that has improved.    Patient is medically stable and safe to be discharged home with home oxygen.

## 2021-01-20 NOTE — PROGRESS NOTE ADULT - ASSESSMENT
61y/o male with pmhx of PE on eliquis 5 years ago secondary to factor 5 leiden, HLD, depression, congenital solitary kidney, vasovagal syncope presented from [facility] for hypoxia.    # Acute hypoxic respiratory failure secondary to COVID severe viral pneumonia with superimposed chemical pneumonitis, less likely bacterial pneumonia  - patient is in late inflammatory phase, COVID Ab positive, therefore not a candidate for RDV  - CXR 1/11: worsening b/l opacities  - CXR 1/20: decreased B/L opacities  - LE duplex negative   - inflammatory markers:    ferritin 1362 > 1274 (1/13)    D-dimer 590 > 931 (1/13)     Procal 0.04    CRP 0.76 > 0.10  - IV decadron completed 13 days, switch to PO prednisone 20mg for 3 more days  - s/p Toci x1 on 1/8 and 1/11  - prone position, OOBTC, PT  - de-escalate O2 requirements to 4L N/C this morning, saturating at 96%  - labs every other day    # Transaminitis (secondary to covid) - improving    # Hyperkalemia - improved after Lokelma     # h/o Pulmonary embolus secondary to factor V leiden  - hx of factor V leiden according to patient though no records in [facility] --> pt follows up at [facility]  - cont eliquis 5mg BID     # h/o Dyslipidemia - c/w atorvastatin  # h/o Depression- cont welbutrin 300mg daily and lexapro 10mg daily  # h/o  Solitary kidney - Creatinine stable     DVT PPx: Lovenox  GI PPx: PPI  FULL CODE    Letter of Medical Necessity for Aldo Ahmadi  Patient requires home oxygen due to COVID PNA, IV antibiotics have been tried along with steroids and have been unsuccessful. Patient is otherwise in chronic stable state.  Patient is aware and agreeable to home oxygen will require concentrator and portable oxygen to patient home as he will travel home via ambulance.   Oxygen sat room air at rest is 88%. Patient requires nasal cannula 4L/min continuously

## 2021-01-20 NOTE — DISCHARGE NOTE PROVIDER - NSDCCPCAREPLAN_GEN_ALL_CORE_FT
PRINCIPAL DISCHARGE DIAGNOSIS  Diagnosis: COVID-19  Assessment and Plan of Treatment: You were hospitalized for COVID pneumonia.   Please stay home and avoid contact with others for at least a week after symptoms resolve and follow government guidelines.   You will be discharged on home oxygen. You will need to use this continuously. You will also be give steroids orally for 3 days. Please follow up with your primary care doctor.      SECONDARY DISCHARGE DIAGNOSES  Diagnosis: History of pulmonary embolism  Assessment and Plan of Treatment: Continue with eliquis 5mg BID

## 2021-01-20 NOTE — PROGRESS NOTE ADULT - PROVIDER SPECIALTY LIST ADULT
Internal Medicine
Infectious Disease
Internal Medicine
Hospitalist
Hospitalist
Internal Medicine
Hospitalist
Infectious Disease
Internal Medicine
Internal Medicine

## 2021-01-22 ENCOUNTER — TRANSCRIPTION ENCOUNTER (OUTPATIENT)
Age: 61
End: 2021-01-22

## 2021-01-28 DIAGNOSIS — Z86.711 PERSONAL HISTORY OF PULMONARY EMBOLISM: ICD-10-CM

## 2021-01-28 DIAGNOSIS — J96.01 ACUTE RESPIRATORY FAILURE WITH HYPOXIA: ICD-10-CM

## 2021-01-28 DIAGNOSIS — Z86.718 PERSONAL HISTORY OF OTHER VENOUS THROMBOSIS AND EMBOLISM: ICD-10-CM

## 2021-01-28 DIAGNOSIS — J12.82 PNEUMONIA DUE TO CORONAVIRUS DISEASE 2019: ICD-10-CM

## 2021-01-28 DIAGNOSIS — I10 ESSENTIAL (PRIMARY) HYPERTENSION: ICD-10-CM

## 2021-01-28 DIAGNOSIS — D68.51 ACTIVATED PROTEIN C RESISTANCE: ICD-10-CM

## 2021-01-28 DIAGNOSIS — Z79.01 LONG TERM (CURRENT) USE OF ANTICOAGULANTS: ICD-10-CM

## 2021-01-28 DIAGNOSIS — R74.01 ELEVATION OF LEVELS OF LIVER TRANSAMINASE LEVELS: ICD-10-CM

## 2021-01-28 DIAGNOSIS — E78.5 HYPERLIPIDEMIA, UNSPECIFIED: ICD-10-CM

## 2021-01-28 DIAGNOSIS — E87.5 HYPERKALEMIA: ICD-10-CM

## 2021-01-28 DIAGNOSIS — U07.1 COVID-19: ICD-10-CM

## 2021-01-28 DIAGNOSIS — J68.0 BRONCHITIS AND PNEUMONITIS DUE TO CHEMICALS, GASES, FUMES AND VAPORS: ICD-10-CM

## 2021-01-28 DIAGNOSIS — Z87.891 PERSONAL HISTORY OF NICOTINE DEPENDENCE: ICD-10-CM

## 2021-01-28 DIAGNOSIS — T59.94XA TOXIC EFFECT OF UNSPECIFIED GASES, FUMES AND VAPORS, UNDETERMINED, INITIAL ENCOUNTER: ICD-10-CM

## 2021-01-28 DIAGNOSIS — Q60.0 RENAL AGENESIS, UNILATERAL: ICD-10-CM

## 2021-01-28 DIAGNOSIS — F32.9 MAJOR DEPRESSIVE DISORDER, SINGLE EPISODE, UNSPECIFIED: ICD-10-CM

## 2021-01-29 ENCOUNTER — TRANSCRIPTION ENCOUNTER (OUTPATIENT)
Age: 61
End: 2021-01-29

## 2021-01-29 PROBLEM — Z00.00 ENCOUNTER FOR PREVENTIVE HEALTH EXAMINATION: Status: ACTIVE | Noted: 2021-01-29

## 2021-02-01 ENCOUNTER — TRANSCRIPTION ENCOUNTER (OUTPATIENT)
Age: 61
End: 2021-02-01

## 2021-02-19 ENCOUNTER — NON-APPOINTMENT (OUTPATIENT)
Age: 61
End: 2021-02-19

## 2021-02-19 DIAGNOSIS — Z87.891 PERSONAL HISTORY OF NICOTINE DEPENDENCE: ICD-10-CM

## 2021-02-19 RX ORDER — ESCITALOPRAM OXALATE 20 MG/1
20 TABLET, FILM COATED ORAL
Refills: 0 | Status: ACTIVE | COMMUNITY

## 2021-02-19 RX ORDER — MONTELUKAST 10 MG/1
10 TABLET, FILM COATED ORAL DAILY
Refills: 0 | Status: ACTIVE | COMMUNITY

## 2021-02-19 RX ORDER — ASPIRIN 81 MG
81 TABLET, DELAYED RELEASE (ENTERIC COATED) ORAL
Refills: 0 | Status: ACTIVE | COMMUNITY

## 2021-02-19 RX ORDER — FLUTICASONE FUROATE AND VILANTEROL TRIFENATATE 200; 25 UG/1; UG/1
200-25 POWDER RESPIRATORY (INHALATION)
Refills: 0 | Status: ACTIVE | COMMUNITY

## 2021-02-26 ENCOUNTER — TRANSCRIPTION ENCOUNTER (OUTPATIENT)
Age: 61
End: 2021-02-26

## 2021-02-26 ENCOUNTER — OUTPATIENT (OUTPATIENT)
Dept: OUTPATIENT SERVICES | Facility: HOSPITAL | Age: 61
LOS: 1 days | Discharge: HOME | End: 2021-02-26

## 2021-02-26 ENCOUNTER — APPOINTMENT (OUTPATIENT)
Dept: INTERNAL MEDICINE | Facility: CLINIC | Age: 61
End: 2021-02-26
Payer: COMMERCIAL

## 2021-02-26 VITALS
SYSTOLIC BLOOD PRESSURE: 100 MMHG | WEIGHT: 190 LBS | DIASTOLIC BLOOD PRESSURE: 70 MMHG | BODY MASS INDEX: 27.2 KG/M2 | OXYGEN SATURATION: 99 % | HEIGHT: 70 IN | HEART RATE: 98 BPM

## 2021-02-26 DIAGNOSIS — U07.1 COVID-19: ICD-10-CM

## 2021-02-26 DIAGNOSIS — Z78.9 OTHER SPECIFIED HEALTH STATUS: Chronic | ICD-10-CM

## 2021-02-26 DIAGNOSIS — I26.99 OTHER PULMONARY EMBOLISM WITHOUT ACUTE COR PULMONALE: ICD-10-CM

## 2021-02-26 PROCEDURE — 99214 OFFICE O/P EST MOD 30 MIN: CPT | Mod: CS,GC

## 2021-02-26 PROCEDURE — 99204 OFFICE O/P NEW MOD 45 MIN: CPT | Mod: CS,GC

## 2021-02-26 RX ORDER — APIXABAN 5 MG/1
5 TABLET, FILM COATED ORAL
Refills: 0 | Status: ACTIVE | COMMUNITY
Start: 2021-02-26

## 2021-03-01 ENCOUNTER — APPOINTMENT (OUTPATIENT)
Dept: PULMONOLOGY | Facility: CLINIC | Age: 61
End: 2021-03-01

## 2021-03-01 DIAGNOSIS — G31.84 MILD COGNITIVE IMPAIRMENT OF UNCERTAIN OR UNKNOWN ETIOLOGY: ICD-10-CM

## 2021-03-01 DIAGNOSIS — U07.1 COVID-19: ICD-10-CM

## 2021-03-05 ENCOUNTER — APPOINTMENT (OUTPATIENT)
Dept: INTERNAL MEDICINE | Facility: CLINIC | Age: 61
End: 2021-03-05

## 2021-03-09 ENCOUNTER — TRANSCRIPTION ENCOUNTER (OUTPATIENT)
Age: 61
End: 2021-03-09

## 2021-03-09 ENCOUNTER — NON-APPOINTMENT (OUTPATIENT)
Age: 61
End: 2021-03-09

## 2021-03-15 ENCOUNTER — OUTPATIENT (OUTPATIENT)
Dept: OUTPATIENT SERVICES | Facility: HOSPITAL | Age: 61
LOS: 1 days | Discharge: HOME | End: 2021-03-15

## 2021-03-15 ENCOUNTER — APPOINTMENT (OUTPATIENT)
Dept: INTERNAL MEDICINE | Facility: CLINIC | Age: 61
End: 2021-03-15
Payer: COMMERCIAL

## 2021-03-15 DIAGNOSIS — Z78.9 OTHER SPECIFIED HEALTH STATUS: Chronic | ICD-10-CM

## 2021-03-15 LAB
ALBUMIN SERPL ELPH-MCNC: 4.1 G/DL
ALP BLD-CCNC: 28 U/L
ALT SERPL-CCNC: 42 U/L
ANION GAP SERPL CALC-SCNC: 10 MMOL/L
AST SERPL-CCNC: 27 U/L
BILIRUB SERPL-MCNC: 0.5 MG/DL
BUN SERPL-MCNC: 12 MG/DL
CALCIUM SERPL-MCNC: 8.9 MG/DL
CHLORIDE SERPL-SCNC: 102 MMOL/L
CO2 SERPL-SCNC: 28 MMOL/L
CREAT SERPL-MCNC: 1 MG/DL
CRP SERPL-MCNC: 0.25 MG/DL
DEPRECATED D DIMER PPP IA-ACNC: 224 NG/ML DDU
FERRITIN SERPL-MCNC: 654 NG/ML
GLUCOSE SERPL-MCNC: 203 MG/DL
POTASSIUM SERPL-SCNC: 4.6 MMOL/L
PROT SERPL-MCNC: 6.5 G/DL
SODIUM SERPL-SCNC: 140 MMOL/L

## 2021-03-15 PROCEDURE — 99443: CPT

## 2021-03-16 NOTE — PLAN
[FreeTextEntry1] : 59yo male with PMHx of PE on eliquis 5 years ago secondary to factor 5 leiden, asthma \par HLD, depression, congenital solitary kidney, vasovagal syncope presented for follow up post COVID with hospital course complicated by acute respiratory failure requiring high flow nasal canula\par \par #COVID 19 Post discharge \par - received toci, steroids\par - Was on high flow, discharge on 4L oxygen and now off oxygen for over two months \par - Still complains of GUZMAN when going up the stairs, but said that its improving \par - D-dimer negative\par - COVID antibodies positive \par - CXR showed bilateral opacities, Patient has a follow up with Dr. Reich next monday \par - 6 min walk test 73% of expected \par -no neuropsychiatric symptoms.\par - C/w eliquis for factor 5 leiden def\par - Repeat D-dimer and CRP are negative. Ferritin is still elevated at 600s\par \par #Transaminitis  \par -Repeat CMP was neagtive \par -Resolved \par \par #Factor 5 leiden def s/p PE \par - C/w eliquis therapeutic \par \par \par #Mild asthma \par -Follow up with Dr. Reich \par -C/w inhalers\par \par #Health maintenance \par -Follow up in 6 months

## 2021-03-16 NOTE — HISTORY OF PRESENT ILLNESS
[Home] : at home, [unfilled] , at the time of the visit. [de-identified] : 61yo male with PMHx of PE on eliquis 5 years ago secondary to factor 5 leiden,\par HLD, depression, congenital solitary kidney, vasovagal syncope, asthma presented for follow up. \par \par Patient was hospitalized for COVID early January, He was discharged on 4 later NC, and has been off oxygen since Early Feb. Patient has been doing well with no complaints except for Dyspnea on walking up the stairs for few floors ( 2-3 ) which he said has improved significantly since the last visit \par

## 2021-03-16 NOTE — END OF VISIT
[FreeTextEntry3] : I was present with the Resident during the key portions of this encounter and provided supervision via audio/visual technology.  I agree with the findings and plan as documented in the Resident's note, unless noted below.\par

## 2021-03-16 NOTE — REVIEW OF SYSTEMS
[Dyspnea on Exertion] : dyspnea on exertion [Fever] : no fever [Chills] : no chills [Fatigue] : no fatigue [Chest Pain] : no chest pain [Palpitations] : no palpitations [Claudication] : no  leg claudication [Wheezing] : no wheezing [Cough] : no cough [Abdominal Pain] : no abdominal pain [Nausea] : no nausea [Constipation] : no constipation [Diarrhea] : no diarrhea

## 2021-03-22 ENCOUNTER — APPOINTMENT (OUTPATIENT)
Dept: PULMONOLOGY | Facility: CLINIC | Age: 61
End: 2021-03-22
Payer: COMMERCIAL

## 2021-03-22 VITALS
DIASTOLIC BLOOD PRESSURE: 92 MMHG | WEIGHT: 208 LBS | HEIGHT: 70 IN | HEART RATE: 89 BPM | SYSTOLIC BLOOD PRESSURE: 132 MMHG | RESPIRATION RATE: 14 BRPM | BODY MASS INDEX: 29.78 KG/M2 | OXYGEN SATURATION: 97 %

## 2021-03-22 DIAGNOSIS — U07.1 COVID-19: ICD-10-CM

## 2021-03-22 DIAGNOSIS — J18.9 PNEUMONIA, UNSPECIFIED ORGANISM: ICD-10-CM

## 2021-03-22 DIAGNOSIS — J45.901 UNSPECIFIED ASTHMA WITH (ACUTE) EXACERBATION: ICD-10-CM

## 2021-03-22 PROCEDURE — 99072 ADDL SUPL MATRL&STAF TM PHE: CPT

## 2021-03-22 PROCEDURE — 99213 OFFICE O/P EST LOW 20 MIN: CPT

## 2021-03-22 NOTE — HISTORY OF PRESENT ILLNESS
[Doing Well] : doing well [Well Controlled] : Well controlled [Checks Regularly] : The patient checks ~his/her~ peak flow regularly [Good Control] : peak flow has been good [None] : None [Adherent] : the patient is adherent with ~his/her~ medication regimen [Goals--Doing Well] : the patient is doing well with ~his/her~ asthma goals [PFTs] : pulmonary function tests [Follow-Up - Routine Clinic] : a routine clinic follow-up of [Snoring] : snoring [Unrefreshing Sleep] : unrefreshing sleep [Sleepy When Sedentary] : sleepy when sedentary [Currently Experiencing] : The patient is currently experiencing symptoms. [Good Sleep Hygiene] : good sleep hygiene [de-identified] : Post COVID HAAD

## 2021-03-22 NOTE — ASSESSMENT
[FreeTextEntry1] : Post infectious HAAD ( COVID 19) \par COVID pneumonia improved\par SP ARF \par HO recurrent VTE on Eliquis ( Prothrombin gene mutation ) \par Possible JOSE

## 2021-03-22 NOTE — REASON FOR VISIT
[Follow-Up] : a follow-up visit [Sleep Apnea] : sleep apnea [Pneumonia] : pneumonia [Shortness of Breath] : shortness of breath

## 2021-03-23 ENCOUNTER — APPOINTMENT (OUTPATIENT)
Dept: INTERNAL MEDICINE | Facility: CLINIC | Age: 61
End: 2021-03-23

## 2021-04-05 DIAGNOSIS — I26.99 OTHER PULMONARY EMBOLISM WITHOUT ACUTE COR PULMONALE: ICD-10-CM

## 2021-05-02 NOTE — HISTORY OF PRESENT ILLNESS
[FreeTextEntry1] : 60 T/o M presented post COVID 19 infection for follow up [de-identified] : 61yo male with PMHx of PE on eliquis 5 years ago secondary to factor 5 leiden,\par HLD, depression, congenital solitary kidney, vasovagal syncope, asthma presented for follow up post COVID.\par Pt. presented to [facility] on 1-3-21 with SOB, weakness, and fevers 10 days\par duration, and found covid positive. Initially was satting well on 2 L NC to 94%\par Chest xray showed b/l opacities. Pt. was seen by ID, was started on steroids\par and was transferred to [facility] 1-5-21. Pt. reports working with PT and "over\par doing it" and then while brushing teeth tonight had eisode of coughing/ choking\par on toothpaste last a few minutes resulting in ppulse ox decreasing to 70s. Pt.\par was placed on non-rebreather improved to 88-90% and was sent to ED for further\par eval.\par In ED Pt. satting 81% on non-rebreather and placed in High flow NC satting 94%.\par \par Patient admitted for Acute hypoxic respiratory failure secondary to COVID\par severe viral pneumonia with superimposed chemical pneumonitis, less likely\par bacterial pneumonia. Patient is in late inflammatory phase, COVID Ab positive,\par therefore not a candidate for RDV. s/p Toci x1 on 1/8 and 1/11 and IV decadron\par completed 16 of steroids CXR 1/20: decreased B/L opacities. PAtient was discharged on 4 L oxygen which he required for 2 weeks, he is off oxygen for 3 weeks, he complains on GUZMAN while going few stairs that is not at baseline, he denied any headaches, no decreased sense of sensation, no psychological complains. \par

## 2021-05-02 NOTE — PHYSICAL EXAM
[Normal] : affect was normal and insight and judgment were intact [de-identified] : lower ext edema +1 ( baseline )

## 2021-05-02 NOTE — PHYSICAL EXAM
[Normal] : affect was normal and insight and judgment were intact [de-identified] : lower ext edema +1 ( baseline )

## 2021-05-02 NOTE — HISTORY OF PRESENT ILLNESS
[FreeTextEntry1] : 60 T/o M presented post COVID 19 infection for follow up [de-identified] : 59yo male with PMHx of PE on eliquis 5 years ago secondary to factor 5 leiden,\par HLD, depression, congenital solitary kidney, vasovagal syncope, asthma presented for follow up post COVID.\par Pt. presented to [facility] on 1-3-21 with SOB, weakness, and fevers 10 days\par duration, and found covid positive. Initially was satting well on 2 L NC to 94%\par Chest xray showed b/l opacities. Pt. was seen by ID, was started on steroids\par and was transferred to [facility] 1-5-21. Pt. reports working with PT and "over\par doing it" and then while brushing teeth tonight had eisode of coughing/ choking\par on toothpaste last a few minutes resulting in ppulse ox decreasing to 70s. Pt.\par was placed on non-rebreather improved to 88-90% and was sent to ED for further\par eval.\par In ED Pt. satting 81% on non-rebreather and placed in High flow NC satting 94%.\par \par Patient admitted for Acute hypoxic respiratory failure secondary to COVID\par severe viral pneumonia with superimposed chemical pneumonitis, less likely\par bacterial pneumonia. Patient is in late inflammatory phase, COVID Ab positive,\par therefore not a candidate for RDV. s/p Toci x1 on 1/8 and 1/11 and IV decadron\par completed 16 of steroids CXR 1/20: decreased B/L opacities. PAtient was discharged on 4 L oxygen which he required for 2 weeks, he is off oxygen for 3 weeks, he complains on GUZMAN while going few stairs that is not at baseline, he denied any headaches, no decreased sense of sensation, no psychological complains. \par

## 2021-05-02 NOTE — HISTORY OF PRESENT ILLNESS
[FreeTextEntry1] : 60 T/o M presented post COVID 19 infection for follow up [de-identified] : 61yo male with PMHx of PE on eliquis 5 years ago secondary to factor 5 leiden,\par HLD, depression, congenital solitary kidney, vasovagal syncope, asthma presented for follow up post COVID.\par Pt. presented to [facility] on 1-3-21 with SOB, weakness, and fevers 10 days\par duration, and found covid positive. Initially was satting well on 2 L NC to 94%\par Chest xray showed b/l opacities. Pt. was seen by ID, was started on steroids\par and was transferred to [facility] 1-5-21. Pt. reports working with PT and "over\par doing it" and then while brushing teeth tonight had eisode of coughing/ choking\par on toothpaste last a few minutes resulting in ppulse ox decreasing to 70s. Pt.\par was placed on non-rebreather improved to 88-90% and was sent to ED for further\par eval.\par In ED Pt. satting 81% on non-rebreather and placed in High flow NC satting 94%.\par \par Patient admitted for Acute hypoxic respiratory failure secondary to COVID\par severe viral pneumonia with superimposed chemical pneumonitis, less likely\par bacterial pneumonia. Patient is in late inflammatory phase, COVID Ab positive,\par therefore not a candidate for RDV. s/p Toci x1 on 1/8 and 1/11 and IV decadron\par completed 16 of steroids CXR 1/20: decreased B/L opacities. PAtient was discharged on 4 L oxygen which he required for 2 weeks, he is off oxygen for 3 weeks, he complains on GUZMAN while going few stairs that is not at baseline, he denied any headaches, no decreased sense of sensation, no psychological complains. \par

## 2021-05-02 NOTE — PHYSICAL EXAM
[Normal] : affect was normal and insight and judgment were intact [de-identified] : lower ext edema +1 ( baseline )

## 2021-05-02 NOTE — PLAN
[FreeTextEntry1] : 59yo male with PMHx of PE on eliquis 5 years ago secondary to factor 5 leiden, asthma \par HLD, depression, congenital solitary kidney, vasovagal syncope presented for follow up post COVID with hospital course complicated by acute respiratory failure requiring high flow nasal canula\par \par #COVID 19 Post discharge screening\par - received toci, steroids\par - Was on high flow, discharge on 4L oxygen and now off oxygen for >3 weeks\par - Still have GUZMAN when going up couple of stairs \par - D-dimer 900 on D/c, Ferritin 1200 ( stable during admission ), CRP 5 ( normalized on D/c)\par - COVID antibodies positive during stay \par - CXR showed bilateral opacities, will need follow up with pulm clinic for further jhon\par - 6 min walk test 73% of expected \par \par -no neuropsychiatric symptoms.\par - C/w eliquis for factor 5 leiden def\par - F/u repeat ferritin, crp and d-dimer\par - F/u in 3 months\par - F/u CMP for transaminitis on discharge\par \par #Factor 5 leiden def s/p PE \par - C/w eliquis therapeutic \par \par \par #Mild asthma \par Follow up with Dr. Reich \par C/w inhalers

## 2021-06-04 ENCOUNTER — APPOINTMENT (OUTPATIENT)
Dept: INTERNAL MEDICINE | Facility: CLINIC | Age: 61
End: 2021-06-04

## 2021-07-21 ENCOUNTER — APPOINTMENT (OUTPATIENT)
Dept: PULMONOLOGY | Facility: CLINIC | Age: 61
End: 2021-07-21
Payer: COMMERCIAL

## 2021-07-21 VITALS
WEIGHT: 205 LBS | SYSTOLIC BLOOD PRESSURE: 138 MMHG | HEIGHT: 70 IN | RESPIRATION RATE: 12 BRPM | OXYGEN SATURATION: 95 % | HEART RATE: 94 BPM | DIASTOLIC BLOOD PRESSURE: 70 MMHG | BODY MASS INDEX: 29.35 KG/M2

## 2021-07-21 DIAGNOSIS — I82.409 ACUTE EMBOLISM AND THROMBOSIS OF UNSPECIFIED DEEP VEINS OF UNSPECIFIED LOWER EXTREMITY: ICD-10-CM

## 2021-07-21 PROCEDURE — 99072 ADDL SUPL MATRL&STAF TM PHE: CPT

## 2021-07-21 PROCEDURE — 99213 OFFICE O/P EST LOW 20 MIN: CPT

## 2021-07-21 NOTE — ASSESSMENT
VACCINE ADMINISTRATION RECORD  PARENT / GUARDIAN APPROVAL  Date: 2020  Vaccine administered to: Raghu Ford     : 2017    MRN: [id number]    A copy of the appropriate Centers for Disease Control and Prevention Vaccine Information statement has b [FreeTextEntry1] : Post infectious HAAD ( COVID 19) resolved \par COVID pneumonia resolved \par SP ARF  \par HO recurrent VTE on Eliquis ( Prothrombin gene mutation ) \par Possible JOSE not interested in testing at this time

## 2021-07-21 NOTE — HISTORY OF PRESENT ILLNESS
[Doing Well] : doing well [Well Controlled] : Well controlled [Checks Regularly] : The patient checks ~his/her~ peak flow regularly [Good Control] : peak flow has been good [None] : None [Adherent] : the patient is adherent with ~his/her~ medication regimen [Goals--Doing Well] : the patient is doing well with ~his/her~ asthma goals [PFTs] : pulmonary function tests [de-identified] : Post COVID HAAD  [Follow-Up - Routine Clinic] : a routine clinic follow-up of [Snoring] : snoring [Unrefreshing Sleep] : unrefreshing sleep [Sleepy When Sedentary] : sleepy when sedentary [Currently Experiencing] : The patient is currently experiencing symptoms. [Good Sleep Hygiene] : good sleep hygiene

## 2021-08-10 ENCOUNTER — OUTPATIENT (OUTPATIENT)
Dept: OUTPATIENT SERVICES | Facility: HOSPITAL | Age: 61
LOS: 1 days | Discharge: HOME | End: 2021-08-10
Payer: COMMERCIAL

## 2021-08-10 ENCOUNTER — RESULT REVIEW (OUTPATIENT)
Age: 61
End: 2021-08-10

## 2021-08-10 DIAGNOSIS — Z78.9 OTHER SPECIFIED HEALTH STATUS: Chronic | ICD-10-CM

## 2021-08-10 DIAGNOSIS — R07.9 CHEST PAIN, UNSPECIFIED: ICD-10-CM

## 2021-08-10 PROCEDURE — 71250 CT THORAX DX C-: CPT | Mod: 26

## 2021-08-17 ENCOUNTER — LABORATORY RESULT (OUTPATIENT)
Age: 61
End: 2021-08-17

## 2021-08-17 ENCOUNTER — OUTPATIENT (OUTPATIENT)
Dept: OUTPATIENT SERVICES | Facility: HOSPITAL | Age: 61
LOS: 1 days | Discharge: HOME | End: 2021-08-17

## 2021-08-17 DIAGNOSIS — Z78.9 OTHER SPECIFIED HEALTH STATUS: Chronic | ICD-10-CM

## 2021-08-17 DIAGNOSIS — Z11.59 ENCOUNTER FOR SCREENING FOR OTHER VIRAL DISEASES: ICD-10-CM

## 2021-08-20 ENCOUNTER — OUTPATIENT (OUTPATIENT)
Dept: OUTPATIENT SERVICES | Facility: HOSPITAL | Age: 61
LOS: 1 days | Discharge: HOME | End: 2021-08-20
Payer: COMMERCIAL

## 2021-08-20 DIAGNOSIS — Z78.9 OTHER SPECIFIED HEALTH STATUS: Chronic | ICD-10-CM

## 2021-08-20 DIAGNOSIS — R06.02 SHORTNESS OF BREATH: ICD-10-CM

## 2021-08-20 PROCEDURE — 94060 EVALUATION OF WHEEZING: CPT | Mod: 26

## 2021-08-20 PROCEDURE — 94729 DIFFUSING CAPACITY: CPT | Mod: 26

## 2021-08-20 PROCEDURE — 94727 GAS DIL/WSHOT DETER LNG VOL: CPT | Mod: 26

## 2022-01-26 ENCOUNTER — APPOINTMENT (OUTPATIENT)
Age: 62
End: 2022-01-26
Payer: COMMERCIAL

## 2022-01-26 VITALS
WEIGHT: 211 LBS | HEIGHT: 70 IN | SYSTOLIC BLOOD PRESSURE: 120 MMHG | BODY MASS INDEX: 30.21 KG/M2 | HEART RATE: 95 BPM | OXYGEN SATURATION: 96 % | RESPIRATION RATE: 12 BRPM | DIASTOLIC BLOOD PRESSURE: 80 MMHG

## 2022-01-26 DIAGNOSIS — I26.99 OTHER PULMONARY EMBOLISM W/OUT ACUTE COR PULMONALE: ICD-10-CM

## 2022-01-26 DIAGNOSIS — R91.8 OTHER NONSPECIFIC ABNORMAL FINDING OF LUNG FIELD: ICD-10-CM

## 2022-01-26 DIAGNOSIS — G47.33 OBSTRUCTIVE SLEEP APNEA (ADULT) (PEDIATRIC): ICD-10-CM

## 2022-01-26 PROCEDURE — 99214 OFFICE O/P EST MOD 30 MIN: CPT

## 2022-01-26 NOTE — ASSESSMENT
[FreeTextEntry1] : Post infectious HAAD ( COVID 19) resolved \par COVID pneumonia resolved \par Some interstitial changes on CT likley post COVID \par SP ARF  \par HO recurrent VTE on Eliquis ( Prothrombin gene mutation ) \par Possible JOSE not interested in testing at this time

## 2022-01-26 NOTE — HISTORY OF PRESENT ILLNESS
[Doing Well] : doing well [Well Controlled] : Well controlled [Checks Regularly] : The patient checks ~his/her~ peak flow regularly [Good Control] : peak flow has been good [None] : None [Adherent] : the patient is adherent with ~his/her~ medication regimen [Goals--Doing Well] : the patient is doing well with ~his/her~ asthma goals [PFTs] : pulmonary function tests [de-identified] : Post COVID HAAD  [Follow-Up - Routine Clinic] : a routine clinic follow-up of [Snoring] : snoring [Unrefreshing Sleep] : unrefreshing sleep [Sleepy When Sedentary] : sleepy when sedentary [Currently Experiencing] : The patient is currently experiencing symptoms. [Good Sleep Hygiene] : good sleep hygiene

## 2022-05-23 NOTE — PHYSICAL THERAPY INITIAL EVALUATION ADULT - LIVES WITH, PROFILE
Michael Fernandez was seen and treated in our emergency department on 5/23/2022.  He may return to work on 05/30/2022.  Is excuse Michael from work until he has a return note from an orthopedist.     If you have any questions or concerns, please don't hesitate to call.      Roger Marquez PA-C in , has 3 steps to enter and 13 steps to the BR with 1 HR/children/spouse

## 2022-08-01 ENCOUNTER — APPOINTMENT (OUTPATIENT)
Age: 62
End: 2022-08-01

## 2022-09-10 NOTE — PLAN
Continue omeprazole for GERD symptoms   [FreeTextEntry1] : 61yo male with PMHx of PE on eliquis 5 years ago secondary to factor 5 leiden, asthma \par HLD, depression, congenital solitary kidney, vasovagal syncope presented for follow up post COVID with hospital course complicated by acute respiratory failure requiring high flow nasal canula\par \par #COVID 19 Post discharge screening\par - received toci, steroids\par - Was on high flow, discharge on 4L oxygen and now off oxygen for >3 weeks\par - Still have GUZMAN when going up couple of stairs \par - D-dimer 900 on D/c, Ferritin 1200 ( stable during admission ), CRP 5 ( normalized on D/c)\par - COVID antibodies positive during stay \par - CXR showed bilateral opacities, will need follow up with pulm clinic for further jhon\par - 6 min walk test 73% of expected \par \par -no neuropsychiatric symptoms.\par - C/w eliquis for factor 5 leiden def\par - F/u repeat ferritin, crp and d-dimer\par - F/u in 3 months\par - F/u CMP for transaminitis on discharge\par \par #Factor 5 leiden def s/p PE \par - C/w eliquis therapeutic \par \par \par #Mild asthma \par Follow up with Dr. Reich \par C/w inhalers

## 2022-09-23 ENCOUNTER — NON-APPOINTMENT (OUTPATIENT)
Age: 62
End: 2022-09-23

## 2022-10-10 ENCOUNTER — NON-APPOINTMENT (OUTPATIENT)
Age: 62
End: 2022-10-10

## 2023-06-09 ENCOUNTER — NON-APPOINTMENT (OUTPATIENT)
Age: 63
End: 2023-06-09

## 2023-12-29 ENCOUNTER — NON-APPOINTMENT (OUTPATIENT)
Age: 63
End: 2023-12-29

## 2024-01-29 NOTE — ED PROVIDER NOTE - WR ORDER ID 1
Alert-The patient is alert, awake and responds to voice. The patient is oriented to time, place, and person. The triage nurse is able to obtain subjective information.
[id number]

## 2024-02-04 ENCOUNTER — NON-APPOINTMENT (OUTPATIENT)
Age: 64
End: 2024-02-04

## 2024-10-20 ENCOUNTER — NON-APPOINTMENT (OUTPATIENT)
Age: 64
End: 2024-10-20

## 2024-11-11 ENCOUNTER — NON-APPOINTMENT (OUTPATIENT)
Age: 64
End: 2024-11-11

## 2024-11-14 ENCOUNTER — NON-APPOINTMENT (OUTPATIENT)
Age: 64
End: 2024-11-14